# Patient Record
Sex: MALE | Race: WHITE | NOT HISPANIC OR LATINO | Employment: OTHER | URBAN - METROPOLITAN AREA
[De-identification: names, ages, dates, MRNs, and addresses within clinical notes are randomized per-mention and may not be internally consistent; named-entity substitution may affect disease eponyms.]

---

## 2022-03-16 ENCOUNTER — HISTORICAL (OUTPATIENT)
Dept: ADMINISTRATIVE | Facility: HOSPITAL | Age: 87
End: 2022-03-16

## 2022-03-16 LAB
ALBUMIN SERPL-MCNC: 3.9 G/DL (ref 3.4–4.8)
ALBUMIN/GLOB SERPL: 1.4 {RATIO} (ref 1.1–2)
ALP SERPL-CCNC: 147 U/L (ref 40–150)
ALT SERPL-CCNC: 42 U/L (ref 0–55)
AST SERPL-CCNC: 39 U/L (ref 5–34)
BILIRUB SERPL-MCNC: 0.8 MG/DL
BILIRUBIN DIRECT+TOT PNL SERPL-MCNC: 0.4 (ref 0–0.5)
BILIRUBIN DIRECT+TOT PNL SERPL-MCNC: 0.4 (ref 0–0.8)
BUN SERPL-MCNC: 20.8 MG/DL (ref 8.4–25.7)
CALCIUM SERPL-MCNC: 9.5 MG/DL (ref 8.7–10.5)
CHLORIDE SERPL-SCNC: 104 MMOL/L (ref 98–107)
CHOLEST SERPL-MCNC: 147 MG/DL
CHOLEST/HDLC SERPL: 3 {RATIO} (ref 0–5)
CO2 SERPL-SCNC: 30 MMOL/L (ref 23–31)
CREAT SERPL-MCNC: 1.3 MG/DL (ref 0.73–1.18)
GLOBULIN SER-MCNC: 2.7 G/DL (ref 2.4–3.5)
GLUCOSE SERPL-MCNC: 83 MG/DL (ref 82–115)
HDLC SERPL-MCNC: 56 MG/DL (ref 35–60)
HEMOLYSIS INTERF INDEX SERPL-ACNC: 3
ICTERIC INTERF INDEX SERPL-ACNC: 1
LDLC SERPL CALC-MCNC: 75 MG/DL (ref 50–140)
LIPEMIC INTERF INDEX SERPL-ACNC: 1
POTASSIUM SERPL-SCNC: 3.8 MMOL/L (ref 3.5–5.1)
PROT SERPL-MCNC: 6.6 G/DL (ref 5.8–7.6)
SODIUM SERPL-SCNC: 145 MMOL/L (ref 136–145)
T4 SERPL-MCNC: 11.68 UG/DL (ref 4.87–11.72)
TRIGL SERPL-MCNC: 81 MG/DL (ref 34–140)
TSH SERPL-ACNC: 2.09 M[IU]/L (ref 0.35–4.94)
VLDLC SERPL CALC-MCNC: 16 MG/DL

## 2022-05-16 ENCOUNTER — LAB REQUISITION (OUTPATIENT)
Dept: LAB | Facility: HOSPITAL | Age: 87
End: 2022-05-16
Payer: MEDICARE

## 2022-05-16 DIAGNOSIS — N18.9 CHRONIC KIDNEY DISEASE, UNSPECIFIED: ICD-10-CM

## 2022-05-16 DIAGNOSIS — E11.51 TYPE 2 DIABETES MELLITUS WITH DIABETIC PERIPHERAL ANGIOPATHY WITHOUT GANGRENE: ICD-10-CM

## 2022-05-16 DIAGNOSIS — I50.32 CHRONIC DIASTOLIC (CONGESTIVE) HEART FAILURE: ICD-10-CM

## 2022-05-16 LAB
ALBUMIN SERPL-MCNC: 3.8 GM/DL (ref 3.4–4.8)
ALP SERPL-CCNC: 144 UNIT/L (ref 40–150)
ALT SERPL-CCNC: 45 UNIT/L (ref 0–55)
AST SERPL-CCNC: 45 UNIT/L (ref 5–34)
BILIRUBIN DIRECT+TOT PNL SERPL-MCNC: 0.4 MG/DL (ref 0–0.5)
BILIRUBIN DIRECT+TOT PNL SERPL-MCNC: 0.4 MG/DL (ref 0–0.8)
BILIRUBIN DIRECT+TOT PNL SERPL-MCNC: 0.8 MG/DL
PROT SERPL-MCNC: 6.2 GM/DL (ref 5.8–7.6)

## 2022-05-16 PROCEDURE — 80076 HEPATIC FUNCTION PANEL: CPT | Performed by: INTERNAL MEDICINE

## 2022-05-17 LAB — PATH REV: NORMAL

## 2022-06-05 ENCOUNTER — HOSPITAL ENCOUNTER (EMERGENCY)
Facility: HOSPITAL | Age: 87
Discharge: HOME | End: 2022-06-06
Attending: EMERGENCY MEDICINE
Payer: MEDICARE

## 2022-06-05 DIAGNOSIS — Z79.01 ANTICOAGULATED: ICD-10-CM
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
## 2022-06-05 DIAGNOSIS — I10 UNCONTROLLED HYPERTENSION: ICD-10-CM

## 2022-06-05 DIAGNOSIS — S51.011A SKIN TEAR OF RIGHT ELBOW WITHOUT COMPLICATION, INITIAL ENCOUNTER: ICD-10-CM

## 2022-06-05 DIAGNOSIS — S00.03XA HEMATOMA OF OCCIPITAL REGION OF SCALP: ICD-10-CM

## 2022-06-05 DIAGNOSIS — S09.90XA CLOSED HEAD INJURY, INITIAL ENCOUNTER: Primary | ICD-10-CM

## 2022-06-05 LAB
ALBUMIN SERPL-MCNC: 3.6 GM/DL (ref 3.4–4.8)
ALBUMIN/GLOB SERPL: 1.3 RATIO (ref 1.1–2)
ALP SERPL-CCNC: 137 UNIT/L (ref 40–150)
ALT SERPL-CCNC: 47 UNIT/L (ref 0–55)
APTT PPP: 31.1 SECONDS (ref 23.2–33.7)
AST SERPL-CCNC: 46 UNIT/L (ref 5–34)
BASOPHILS # BLD AUTO: 0.03 X10(3)/MCL (ref 0–0.2)
BASOPHILS NFR BLD AUTO: 0.5 %
BILIRUBIN DIRECT+TOT PNL SERPL-MCNC: 0.8 MG/DL
BUN SERPL-MCNC: 17.6 MG/DL (ref 8.4–25.7)
CALCIUM SERPL-MCNC: 9 MG/DL (ref 8.8–10)
CHLORIDE SERPL-SCNC: 107 MMOL/L (ref 98–107)
CO2 SERPL-SCNC: 22 MMOL/L (ref 23–31)
CREAT SERPL-MCNC: 1.7 MG/DL (ref 0.73–1.18)
EOSINOPHIL # BLD AUTO: 0.06 X10(3)/MCL (ref 0–0.9)
EOSINOPHIL NFR BLD AUTO: 0.9 %
ERYTHROCYTE [DISTWIDTH] IN BLOOD BY AUTOMATED COUNT: 15.3 % (ref 11.5–17)
GLOBULIN SER-MCNC: 2.7 GM/DL (ref 2.4–3.5)
GLUCOSE SERPL-MCNC: 207 MG/DL (ref 82–115)
HCT VFR BLD AUTO: 41.3 % (ref 42–52)
HGB BLD-MCNC: 13.3 GM/DL (ref 14–18)
IMM GRANULOCYTES # BLD AUTO: 0.02 X10(3)/MCL (ref 0–0.02)
IMM GRANULOCYTES NFR BLD AUTO: 0.3 % (ref 0–0.43)
INR BLD: 1.44 (ref 0–1.3)
LYMPHOCYTES # BLD AUTO: 0.78 X10(3)/MCL (ref 0.6–4.6)
LYMPHOCYTES NFR BLD AUTO: 12 %
MCH RBC QN AUTO: 30.9 PG (ref 27–31)
MCHC RBC AUTO-ENTMCNC: 32.2 MG/DL (ref 33–36)
MCV RBC AUTO: 96 FL (ref 80–94)
MONOCYTES # BLD AUTO: 0.51 X10(3)/MCL (ref 0.1–1.3)
MONOCYTES NFR BLD AUTO: 7.9 %
NEUTROPHILS # BLD AUTO: 5.1 X10(3)/MCL (ref 2.1–9.2)
NEUTROPHILS NFR BLD AUTO: 78.4 %
NRBC BLD AUTO-RTO: 0 %
PLATELET # BLD AUTO: 108 X10(3)/MCL (ref 130–400)
PMV BLD AUTO: 9.9 FL (ref 9.4–12.4)
POTASSIUM SERPL-SCNC: 4.3 MMOL/L (ref 3.5–5.1)
PROT SERPL-MCNC: 6.3 GM/DL (ref 5.8–7.6)
PROTHROMBIN TIME: 17.3 SECONDS (ref 12.5–14.5)
RBC # BLD AUTO: 4.3 X10(6)/MCL (ref 4.7–6.1)
SODIUM SERPL-SCNC: 140 MMOL/L (ref 136–145)
WBC # SPEC AUTO: 6.5 X10(3)/MCL (ref 4.5–11.5)

## 2022-06-05 PROCEDURE — 90715 TDAP VACCINE 7 YRS/> IM: CPT | Performed by: EMERGENCY MEDICINE

## 2022-06-05 PROCEDURE — 99285 EMERGENCY DEPT VISIT HI MDM: CPT | Mod: 25

## 2022-06-05 PROCEDURE — 85610 PROTHROMBIN TIME: CPT | Performed by: EMERGENCY MEDICINE

## 2022-06-05 PROCEDURE — 36415 COLL VENOUS BLD VENIPUNCTURE: CPT | Performed by: EMERGENCY MEDICINE

## 2022-06-05 PROCEDURE — 63600175 PHARM REV CODE 636 W HCPCS: Performed by: EMERGENCY MEDICINE

## 2022-06-05 PROCEDURE — 85730 THROMBOPLASTIN TIME PARTIAL: CPT | Performed by: EMERGENCY MEDICINE

## 2022-06-05 PROCEDURE — 96374 THER/PROPH/DIAG INJ IV PUSH: CPT

## 2022-06-05 PROCEDURE — 90471 IMMUNIZATION ADMIN: CPT | Performed by: EMERGENCY MEDICINE

## 2022-06-05 PROCEDURE — G0390 TRAUMA RESPONS W/HOSP CRITI: HCPCS | Performed by: EMERGENCY MEDICINE

## 2022-06-05 PROCEDURE — 80053 COMPREHEN METABOLIC PANEL: CPT | Performed by: EMERGENCY MEDICINE

## 2022-06-05 PROCEDURE — 85025 COMPLETE CBC W/AUTO DIFF WBC: CPT | Performed by: EMERGENCY MEDICINE

## 2022-06-05 RX ORDER — HYDRALAZINE HYDROCHLORIDE 20 MG/ML
10 INJECTION INTRAMUSCULAR; INTRAVENOUS
Status: COMPLETED | OUTPATIENT
Start: 2022-06-05 | End: 2022-06-05

## 2022-06-05 RX ORDER — LIDOCAINE HYDROCHLORIDE AND EPINEPHRINE 20; 10 MG/ML; UG/ML
10 INJECTION, SOLUTION INFILTRATION; PERINEURAL ONCE
Status: DISCONTINUED | OUTPATIENT
Start: 2022-06-06 | End: 2022-06-06 | Stop reason: HOSPADM

## 2022-06-05 RX ORDER — LIDOCAINE HYDROCHLORIDE 20 MG/ML
INJECTION, SOLUTION INFILTRATION; PERINEURAL
Status: DISCONTINUED
Start: 2022-06-05 | End: 2022-06-06 | Stop reason: HOSPADM

## 2022-06-05 RX ADMIN — HYDRALAZINE HYDROCHLORIDE 10 MG: 20 INJECTION, SOLUTION INTRAMUSCULAR; INTRAVENOUS at 10:06

## 2022-06-05 RX ADMIN — TETANUS TOXOID, REDUCED DIPHTHERIA TOXOID AND ACELLULAR PERTUSSIS VACCINE, ADSORBED 0.5 ML: 5; 2.5; 8; 8; 2.5 SUSPENSION INTRAMUSCULAR at 10:06

## 2022-06-06 VITALS
DIASTOLIC BLOOD PRESSURE: 83 MMHG | OXYGEN SATURATION: 98 % | WEIGHT: 200 LBS | SYSTOLIC BLOOD PRESSURE: 183 MMHG | BODY MASS INDEX: 27.09 KG/M2 | RESPIRATION RATE: 19 BRPM | TEMPERATURE: 98 F | HEART RATE: 67 BPM | HEIGHT: 72 IN

## 2022-06-06 NOTE — ED PROVIDER NOTES
Encounter Date: 6/5/2022    SCRIBE #1 NOTE: I, Danae Yuan, am scribing for, and in the presence of,  Criss Garcia DO. I have scribed the following portions of the note - Other sections scribed: HPI, ROS, and PE.       History     Chief Complaint   Patient presents with    Fall      87 y.o. male with a history of DM, HTN, HLD, CVA, CKD, CAD, and CHF with a pacemaker in place who presents to the ED via EMS as a level two trauma after slipping and falling in the bathroom just PTA. Patient states he did hit the back of his head but denies any LOC. He notes he is on Eliquis. EMS states the patient has a small laceration to the posterior head and an abrasion to the R elbow. EMS reports the patient is ambulatory with a walker at baseline. Per EMS, the patient's CBG was 237 en route.       The history is provided by the patient and the EMS personnel.   Fall  The accident occurred just prior to arrival. Fall occurred: in the bathroom. Distance fallen: ground level fall. He landed on a hard floor. There was no blood loss. The point of impact was the head and right elbow. He was ambulatory at the scene. There was no entrapment after the fall. There was no drug use involved in the accident. There was no alcohol use involved in the accident. Pertinent negatives include no fever, no numbness, no nausea, no vomiting, no hematuria and no headaches. Treatment on scene includes a c-collar. He has tried nothing for the symptoms.       Review of Systems   Constitutional: Negative for chills, diaphoresis and fever.   HENT: Negative for congestion and sore throat.    Eyes: Negative for visual disturbance.   Respiratory: Negative for cough and shortness of breath.    Cardiovascular: Negative for chest pain and palpitations.   Gastrointestinal: Negative for diarrhea, nausea and vomiting.   Genitourinary: Negative for dysuria and hematuria.   Skin: Negative for rash.   Neurological: Negative for syncope, weakness, numbness and  headaches.   All other systems reviewed and are negative.      Physical Exam     Vitals:    06/05/22 2207 06/05/22 2212 06/05/22 2213 06/05/22 2220   BP: (!) 199/95  (!) 202/94 (!) 203/93   Pulse: 68  107 73   Resp: 14  18 12   Temp: 97.5 °F (36.4 °C)      SpO2: 97%  (!) 93% 97%   Weight:  90.7 kg (200 lb)     Height:  6' (1.829 m)      06/05/22 2234 06/05/22 2242 06/05/22 2327 06/05/22 2342   BP: (!) 193/94 (!) 189/87 (!) 182/90 (!) 183/83   Pulse: 67 66 68 67   Resp: (!) 95 17 15 19   Temp: 98.1 °F (36.7 °C)      SpO2: (!) 94% 97% 98% 98%   Weight:       Height:           Physical Exam    Nursing note and vitals reviewed.  Constitutional: He appears well-developed and well-nourished. He is not diaphoretic. No distress. Cervical collar in place.   HENT:   Right Ear: External ear normal. Tympanic membrane is not perforated. No hemotympanum.   Left Ear: External ear normal. Tympanic membrane is not perforated. No hemotympanum.   Nose: Nose normal. No nasal deformity, septal deviation or nasal septal hematoma. No epistaxis.   Mouth/Throat: Oropharynx is clear and moist and mucous membranes are normal.   Hematoma and abrasion to the R occipital scalp   Eyes: Conjunctivae and EOM are normal. Pupils are equal, round, and reactive to light.   Pupils 3-2 mm bilaterally   Neck: Neck supple. No tracheal deviation present.   Normal range of motion.  Cardiovascular: Normal rate, regular rhythm, normal heart sounds and intact distal pulses.   Pulses:       Radial pulses are 2+ on the right side and 2+ on the left side.        Posterior tibial pulses are 2+ on the right side and 2+ on the left side.   dopplerable L dorsalis pedis pulse and R PT pulse  +1 pitting edema   Pulmonary/Chest: Breath sounds normal. No respiratory distress. He exhibits no tenderness.   Abdominal: Abdomen is soft. Bowel sounds are normal. He exhibits no distension. There is no abdominal tenderness. There is no rebound.   Musculoskeletal:         General:  No tenderness. Normal range of motion.      Cervical back: Normal range of motion and neck supple. No deformity or tenderness. No spinous process tenderness or muscular tenderness.      Thoracic back: No deformity or tenderness.      Lumbar back: No deformity or tenderness.      Comments: No step-offs  Skin tear to right elbow      Neurological: He is alert and oriented to person, place, and time. He has normal strength. No cranial nerve deficit or sensory deficit. GCS eye subscore is 4. GCS verbal subscore is 5. GCS motor subscore is 6.   Skin: Skin is warm and dry. Capillary refill takes less than 2 seconds. No ecchymosis noted. No pallor.   Chronic skin changes to the bilateral LE       Psychiatric: He has a normal mood and affect.         ED Course   Procedures  Labs Reviewed   COMPREHENSIVE METABOLIC PANEL - Abnormal; Notable for the following components:       Result Value    Carbon Dioxide 22 (*)     Glucose Level 207 (*)     Creatinine 1.70 (*)     Aspartate Aminotransferase 46 (*)     All other components within normal limits   PROTIME-INR - Abnormal; Notable for the following components:    PT 17.3 (*)     INR 1.44 (*)     All other components within normal limits   CBC WITH DIFFERENTIAL - Abnormal; Notable for the following components:    RBC 4.30 (*)     Hgb 13.3 (*)     Hct 41.3 (*)     MCV 96.0 (*)     MCHC 32.2 (*)     Platelet 108 (*)     IG# 0.02 (*)     All other components within normal limits   APTT - Normal   CBC W/ AUTO DIFFERENTIAL    Narrative:     The following orders were created for panel order CBC auto differential.  Procedure                               Abnormality         Status                     ---------                               -----------         ------                     CBC with Differential[[id number]]        Abnormal            Final result                 Please view results for these tests on the individual orders.          Imaging Results          CT Head Without  Contrast (Final result)  Result time 06/06/22 06:03:59    Final result by Yang Bland MD (06/06/22 06:03:59)                 Impression:    Impression:    1. There is a large posterior right parietal scalp contusion with likely hematoma component. No underlying bony injury is identified.    2. No acute intracranial process identified. Details as above.      Electronically signed by: Yang Bland  Date:    06/06/2022  Time:    06:03             Narrative:    EXAMINATION:  CT HEAD WITHOUT CONTRAST    CLINICAL HISTORY:  fall on blood thinners;    TECHNIQUE:  Low dose axial images were obtained through the head.  Coronal and sagittal reformations were also performed. Contrast was not administered.    Automatic exposure control was utilized to reduce the patient's radiation dose.    DLP= 956    COMPARISON:  None.    FINDINGS:  Hemorrhage: No acute intracranial hemorrhage is seen.    CSF spaces: The ventricles, sulci and basal cisterns all appear moderately prominent consistent with global cerebral atrophy.    Brain parenchyma: There is preservation of the grey white junction throughout. Mild microvascular change is seen in portions of the periventricular and deep white matter tracts. An old infarct is seen in the right basal ganglia with ex vacuo dilatation of the frontal horn of the right lateral ventricle (series 2, image 17). An old lacunar infarct is present in the left basal ganglia (series 2, image 20).    Cerebellum: Unremarkable.    Sella and skull base: The sella appears to be within normal limits for age.    Herniation: None.    Intracranial calcifications: Incidental note is made of bilateral choroid plexus calcification. Incidental note is made of some pineal region calcification. Incidental note is made of some calcification of the falx. There is focal calcification in the right perisylvian temporal sulcus (series 2, image 18) which may be vascular in origin.    Calvarium: No acute linear or  depressed skull fracture is seen.    Scalp: There is a large posterior right parietal scalp contusion with likely hematoma component. There is a 4 mm calcific density in the left paramedian frontal scalp (series 4, image 17). Note is made of a bony exostosis near this hematoma.    Maxillofacial Structures:    Paranasal sinuses: A small fluid level is seen in the left maxillary sinus.    Orbits: The orbits appear unremarkable.    Zygomatic arches: The zygomatic arches are intact and unremarkable.    Temporal bones and mastoids: The temporal bones and mastoids appear unremarkable.    TMJ: The mandibular condyles appear normally placed with respect to the mandibular fossa.                    Preliminary result by Yang Bland MD (06/05/22 22:29:57)                 Narrative:    START OF REPORT:  Technique: CT of the head was performed without intravenous contrast with axial as well as coronal and sagittal images.    Comparison: None.    Dosage Information: Automated exposure control was utilized.    Clinical history: Fell, hit posterior head.    Findings:  Hemorrhage: No acute intracranial hemorrhage is seen.  CSF spaces: The ventricles, sulci and basal cisterns all appear moderately prominent consistent with global cerebral atrophy.  Brain parenchyma: There is preservation of the grey white junction throughout. Mild microvascular change is seen in portions of the periventricular and deep white matter tracts. An old infarct is seen in the right basal ganglia with ex vacuo dilatation of the frontal horn of the right lateral ventricle (series 2, image 17). An old lacunar infarct is present in the left basal ganglia (series 2, image 20).  Cerebellum: Unremarkable.  Sella and skull base: The sella appears to be within normal limits for age.  Herniation: None.  Intracranial calcifications: Incidental note is made of bilateral choroid plexus calcification. Incidental note is made of some pineal region calcification.  Incidental note is made of some calcification of the falx. There is focal calcification in the right perisylvian temporal sulcus (series 2, image 18) which may be vascular in origin.  Calvarium: No acute linear or depressed skull fracture is seen.  Scalp: There is a large posterior right parietal scalp contusion with likely hematoma component. There is a 4 mm calcific density in the left paramedian frontal scalp (series 4, image 17). Note is made of a bony exostosis near this hematoma.    Maxillofacial Structures:  Paranasal sinuses: A small fluid level is seen in the left maxillary sinus.  Orbits: The orbits appear unremarkable.  Zygomatic arches: The zygomatic arches are intact and unremarkable.  Temporal bones and mastoids: The temporal bones and mastoids appear unremarkable.  TMJ: The mandibular condyles appear normally placed with respect to the mandibular fossa.      Impression:  1. There is a large posterior right parietal scalp contusion with likely hematoma component. No underlying bony injury is identified.  2. No acute intracranial process identified. Details as above.                                 CT Cervical Spine Without Contrast (Final result)  Result time 06/06/22 06:05:55    Final result by Yang Bland MD (06/06/22 06:05:55)                 Impression:    Impression:    1. No acute fracture dislocation or subluxation is seen.    2. Degenerative changes and other details as above.      Electronically signed by: Yang Bland  Date:    06/06/2022  Time:    06:05             Narrative:    EXAMINATION:  CT CERVICAL SPINE WITHOUT CONTRAST    CLINICAL HISTORY:  fall, hit head on thinners;    TECHNIQUE:  CT of the cervical spine Without contrast. Sagittal and coronal reconstructions were performed on the source images.    Automatic exposure control was utilized to reduce the patient's radiation dose.    DLP = 295    COMPARISON:  No prior imaging available for comparison.    FINDINGS:  Lung apices:  The visualized lung apices appear unremarkable.    Spine:    Spinal canal: Mild canal stenosis is seen at C5-C6 and C6-C7 secondary to disc herniation.    Spinal cord: The spinal cord appears unremarkable.    Mineralization: Mild osteopenia is seen in the visualized bony structures.    Scoliosis: No significant scoliosis is seen.    Vertebral Fusion: No vertebral fusion is identified.    Listhesis: No significant listhesis is identified.    Lordosis: The cervical lordosis is maintained.    Intervertebral disc spaces: Moderately decreased disc height is seen at C5-C6 and C6-C7.    Osteophytes: Pronounced multilevel endplate osteophytes are seen.    Endplate Sclerosis: Multilevel endplate sclerosis is seen.    Uncovertebral degenerative changes: Pronounced multilevel uncovertebral joint arthrosis is seen.    Facet degenerative changes: Pronounced multilevel facet degenerative changes are seen.    Fractures: No acute fracture dislocation or subluxation is seen.                    Preliminary result by Yang Bland MD (06/05/22 22:28:22)                 Narrative:    START OF REPORT:  Technique: CT of the cervical spine was performed without intravenous contrast with axial as well as sagittal and coronal images.    Comparison: None.    Dosage Information: Automated exposure control was utilized.    Clinical history: Fell, hit posterior head.    Findings:  Lung apices: The visualized lung apices appear unremarkable.  Spine:  Spinal canal: Mild canal stenosis is seen at C5-C6 and C6-C7 secondary to disc herniation.  Spinal cord: The spinal cord appears unremarkable.  Mineralization: Mild osteopenia is seen in the visualized bony structures.  Scoliosis: No significant scoliosis is seen.  Vertebral Fusion: No vertebral fusion is identified.  Listhesis: No significant listhesis is identified.  Lordosis: The cervical lordosis is maintained.  Intervertebral disc spaces: Moderately decreased disc height is seen at C5-C6  and C6-C7.  Osteophytes: Pronounced multilevel endplate osteophytes are seen.  Endplate Sclerosis: Multilevel endplate sclerosis is seen.  Uncovertebral degenerative changes: Pronounced multilevel uncovertebral joint arthrosis is seen.  Facet degenerative changes: Pronounced multilevel facet degenerative changes are seen.  Fractures: No acute fracture dislocation or subluxation is seen.      Impression:  1. No acute fracture dislocation or subluxation is seen.  2. Degenerative changes and other details as above.                                 X-Ray Chest AP Portable (Final result)  Result time 06/06/22 06:40:41    Final result by Yang Bland MD (06/06/22 06:40:41)                 Impression:      No acute cardiopulmonary process.  Patient's hand obscures the right upper lobe.      Electronically signed by: Yang Bland  Date:    06/06/2022  Time:    06:40             Narrative:    EXAMINATION:  XR CHEST AP PORTABLE    CLINICAL HISTORY:  Unspecified fall, initial encounter    TECHNIQUE:  Single view of the chest    COMPARISON:  No prior imaging available for comparison.    FINDINGS:  No focal opacification, pleural effusion, or pneumothorax.    Cardiomediastinal silhouette is prominent.    No acute osseous abnormality.                              X-Rays:   Independently Interpreted Readings:   Other Readings:  CXR: no acute cardiopulmonary process        Medications   Tdap (BOOSTRIX) vaccine injection 0.5 mL (0.5 mLs Intramuscular Given 6/5/22 2230)   hydrALAZINE injection 10 mg (10 mg Intravenous Given 6/5/22 2241)     Medical Decision Making:   Initial Assessment:   87-year-old male presenting as a fall, hit his head and is on blood thinners.  Level 2 trauma.  ABC's intact.  Vital stable.  Small hematoma/abrasion to back of head. GCS 15   Independently Interpreted Test(s):   I have ordered and independently interpreted X-rays - see prior notes.  Clinical Tests:   Lab Tests: Ordered and Reviewed       <>  Summary of Lab: Mild hyperglycemia and anemia   Radiological Study: Ordered and Reviewed  ED Management:  Tdap given   CXR clear  CT head and c-spine normal            Scribe Attestation:   Scribe #1: I performed the above scribed service and the documentation accurately describes the services I performed. I attest to the accuracy of the note.    Attending Attestation:           Physician Attestation for Scribe:  Physician Attestation Statement for Scribe #1: I, Danae Yuan, reviewed documentation, as scribed by Criss Garcia, DO in my presence, and it is both accurate and complete.                      Clinical Impression:   Final diagnoses:  [W19.XXXA] Fall  [S09.90XA] Closed head injury, initial encounter (Primary)  [S00.03XA] Hematoma of occipital region of scalp  [S51.011A] Skin tear of right elbow without complication, initial encounter  [Z79.01] Anticoagulated  [I10] Uncontrolled hypertension          ED Disposition Condition    Discharge Stable        ED Prescriptions     None        Follow-up Information     Follow up With Specialties Details Why Contact Info    [facility] - Emergency Dept Emergency Medicine  As needed, If symptoms worsen [address] [facility] [id number]  [phone]           Criss Garcia MD  06/11/22 0778

## 2022-09-28 ENCOUNTER — HOSPITAL ENCOUNTER (EMERGENCY)
Facility: HOSPITAL | Age: 87
Discharge: HOME | End: 2022-09-28
Attending: STUDENT IN AN ORGANIZED HEALTH CARE EDUCATION/TRAINING PROGRAM
Payer: MEDICARE

## 2022-09-28 VITALS
SYSTOLIC BLOOD PRESSURE: 172 MMHG | HEIGHT: 72 IN | HEART RATE: 65 BPM | DIASTOLIC BLOOD PRESSURE: 80 MMHG | WEIGHT: 170 LBS | OXYGEN SATURATION: 96 % | BODY MASS INDEX: 23.03 KG/M2 | TEMPERATURE: 98 F | RESPIRATION RATE: 20 BRPM

## 2022-09-28 DIAGNOSIS — S80.01XA CONTUSION OF RIGHT KNEE, INITIAL ENCOUNTER: Primary | ICD-10-CM

## 2022-09-28 DIAGNOSIS — M25.561 RIGHT KNEE PAIN: ICD-10-CM

## 2022-09-28 DIAGNOSIS — S09.90XA CLOSED HEAD INJURY, INITIAL ENCOUNTER: ICD-10-CM

## 2022-09-28 PROCEDURE — 99284 EMERGENCY DEPT VISIT MOD MDM: CPT | Mod: 25

## 2022-09-28 NOTE — FIRST PROVIDER EVALUATION
Medical screening examination initiated.  I have conducted a focused provider triage encounter, findings are as follows:    Brief history of present illness:  89 yo male presents to ED for evaluation of right knee pain after fall 3 days ago.     Vitals:    09/28/22 1754   BP: (!) 174/56   Pulse: 68   Resp: 18   Temp: 97.7 °F (36.5 °C)   TempSrc: Oral   SpO2: 98%   Weight: 77.1 kg (170 lb)   Height: 6' (1.829 m)       Pertinent physical exam:  Awake and sitting in chair    Brief workup plan:  XR knee    Preliminary workup initiated; this workup will be continued and followed by the physician or advanced practice provider that is assigned to the patient when roomed.

## 2022-09-29 NOTE — ED PROVIDER NOTES
Encounter Date: 9/28/2022       History     Chief Complaint   Patient presents with    Knee Pain     Pt to ER via [facility] for right knee pain.  Fall 3 days ago.  Increased pain and difficulty walking.     Patient is a 88-year-old male  that presents with fall that has been present 3 days ago. Associated symptoms hit head and right knee. Surrounding information is lost balance. Exacerbated by nothing. Relieved by nothing. Patient treatment prior to arrival none. Risk factors include none. Other history pertaining to this complaint nothing.       The history is provided by the patient. No  was used.   Review of patient's allergies indicates:  No Known Allergies  Past Medical History:   Diagnosis Date    Diabetes mellitus     Hypercholesterolemia     Hypertension     Stroke      Past Surgical History:   Procedure Laterality Date    INSERTION OF PACEMAKER      KNEE SURGERY Right      History reviewed. No pertinent family history.  Social History     Tobacco Use    Smoking status: Never    Smokeless tobacco: Never   Substance Use Topics    Alcohol use: Not Currently    Drug use: Not Currently     Review of Systems   Constitutional:  Negative for fever.   Respiratory:  Negative for cough and shortness of breath.    Cardiovascular:  Negative for chest pain.   Gastrointestinal:  Negative for abdominal pain.   Genitourinary:  Negative for difficulty urinating and dysuria.   Musculoskeletal:  Negative for gait problem.   Skin:  Negative for color change.   Neurological:  Negative for dizziness, speech difficulty and headaches.   Psychiatric/Behavioral:  Negative for hallucinations and suicidal ideas.    All other systems reviewed and are negative.    Physical Exam     Initial Vitals [09/28/22 1754]   BP Pulse Resp Temp SpO2   (!) 174/56 68 18 97.7 °F (36.5 °C) 98 %      MAP       --         Physical Exam    Nursing note and vitals reviewed.  Constitutional: He appears well-developed and well-nourished.    HENT:   Head: Normocephalic.   Eyes: EOM are normal.   Neck: Neck supple.   Normal range of motion.  Cardiovascular:  Normal rate, regular rhythm, normal heart sounds and intact distal pulses.           Pulmonary/Chest: Breath sounds normal.   Abdominal: Abdomen is soft. Bowel sounds are normal.   Musculoskeletal:         General: Normal range of motion.      Cervical back: Normal range of motion and neck supple.      Comments: Swelling right knee     Neurological: He is alert and oriented to person, place, and time. He has normal strength.   Skin: Skin is warm and dry. Capillary refill takes less than 2 seconds.   Abrasion to forehead   Psychiatric: He has a normal mood and affect. His behavior is normal. Judgment and thought content normal.       ED Course   Procedures  Labs Reviewed - No data to display       Imaging Results              CT Cervical Spine Without Contrast (Final result)  Result time 09/28/22 21:10:28      Final result by Bird Cheng MD (09/28/22 21:10:28)                   Impression:      No fracture of the cervical spine.  Multilevel spondylotic changes are noted with multilevel neural foraminal narrowing.      Electronically signed by: Bird Cheng MD  Date:    09/28/2022  Time:    21:10               Narrative:    EXAMINATION:  CT CERVICAL SPINE WITHOUT CONTRAST    CLINICAL HISTORY:  fall;    TECHNIQUE:  Low dose axial images, sagittal and coronal reformations were performed though the cervical spine.  Contrast was not administered.    COMPARISON:  06/05/2022    FINDINGS:  Straightening the normal lordotic curvature.  The alignment is normal.  The vertebral heights are maintained intervertebral disc space narrowing is identified.  No evidence for compression deformity, fracture, or subluxation.  Ankylosed anterior osteophytes are identified.  No evidence for central canal stenosis.  Multilevel uncovertebral facet arthropathy identified with multilevel neural foraminal narrowing.    The  soft tissues of the neck are grossly normal.  Moderate to large effusion is identified on the right with atelectatic changes.                                       CT Head Without Contrast (Final result)  Result time 09/28/22 21:06:36      Final result by Bird Cheng MD (09/28/22 21:06:36)                   Impression:      No acute intracranial abnormality.  Findings consistent with microangiopathy.      Electronically signed by: Bird Cheng MD  Date:    09/28/2022  Time:    21:06               Narrative:    EXAMINATION:  CT HEAD WITHOUT CONTRAST    CLINICAL HISTORY:  fall;    TECHNIQUE:  Low dose axial CT images obtained throughout the head without intravenous contrast. Sagittal and coronal reconstructions were performed.    COMPARISON:  06/05/2022    FINDINGS:  Intracranial compartment:    Ventricles and sulci are normal in size for age with ex vacuo dilatation..  No extra-axial blood or fluid collections.    The brain parenchyma appears normal. No parenchymal mass, hemorrhage, edema or major vascular distribution infarct.    Skull/extracranial contents (limited evaluation): No fracture. Mastoid air cells and paranasal sinuses are essentially clear.                                       X-Ray Knee Complete 4 Or More Views Right (In process)                      Medications - No data to display                ED Course as of 09/28/22 2203   Wed Sep 28, 2022   2037 CT head indicated. Fall hit head on blood thinners [CL]   2202 Reviewed knee xray with Dr Garcia [CL]      ED Course User Index  [CL] GOLDY Dixon                 Clinical Impression:   Final diagnoses:  [M25.561] Right knee pain  [S80.01XA] Contusion of right knee, initial encounter (Primary)  [S09.90XA] Closed head injury, initial encounter        ED Disposition Condition    Discharge Stable          ED Prescriptions    None       Follow-up Information       Follow up With Specialties Details Why Contact Info    Your Primary Care Provider   Call in 3 days ed follow up              Jay Rivera, GOLDY  09/28/22 0405

## 2023-01-19 ENCOUNTER — LAB REQUISITION (OUTPATIENT)
Dept: LAB | Facility: HOSPITAL | Age: 88
End: 2023-01-19
Payer: MEDICARE

## 2023-01-19 DIAGNOSIS — N18.9 CHRONIC KIDNEY DISEASE, UNSPECIFIED: ICD-10-CM

## 2023-01-19 DIAGNOSIS — N39.0 URINARY TRACT INFECTION, SITE NOT SPECIFIED: ICD-10-CM

## 2023-01-19 LAB
APPEARANCE UR: CLEAR
BACTERIA #/AREA URNS AUTO: NORMAL /HPF
BILIRUB UR QL STRIP.AUTO: NEGATIVE MG/DL
COLOR UR AUTO: YELLOW
GLUCOSE UR QL STRIP.AUTO: NEGATIVE MG/DL
KETONES UR QL STRIP.AUTO: NEGATIVE MG/DL
LEUKOCYTE ESTERASE UR QL STRIP.AUTO: NEGATIVE UNIT/L
NITRITE UR QL STRIP.AUTO: NEGATIVE
PH UR STRIP.AUTO: 7 [PH]
PROT UR QL STRIP.AUTO: NEGATIVE MG/DL
RBC #/AREA URNS AUTO: <5 /HPF
RBC UR QL AUTO: NEGATIVE UNIT/L
SP GR UR STRIP.AUTO: 1.01 (ref 1–1.03)
SQUAMOUS #/AREA URNS AUTO: <5 /HPF
UROBILINOGEN UR STRIP-ACNC: 0.2 MG/DL
WBC #/AREA URNS AUTO: <5 /HPF

## 2023-01-19 PROCEDURE — 81001 URINALYSIS AUTO W/SCOPE: CPT

## 2023-02-06 ENCOUNTER — LAB REQUISITION (OUTPATIENT)
Dept: LAB | Facility: HOSPITAL | Age: 88
End: 2023-02-06
Payer: MEDICARE

## 2023-02-06 DIAGNOSIS — E11.22 TYPE 2 DIABETES MELLITUS WITH DIABETIC CHRONIC KIDNEY DISEASE: ICD-10-CM

## 2023-02-06 DIAGNOSIS — I13.0 HYPERTENSIVE HEART AND CHRONIC KIDNEY DISEASE WITH HEART FAILURE AND STAGE 1 THROUGH STAGE 4 CHRONIC KIDNEY DISEASE, OR UNSPECIFIED CHRONIC KIDNEY DISEASE: ICD-10-CM

## 2023-02-06 DIAGNOSIS — I25.10 ATHEROSCLEROTIC HEART DISEASE OF NATIVE CORONARY ARTERY WITHOUT ANGINA PECTORIS: ICD-10-CM

## 2023-02-06 DIAGNOSIS — I50.42 CHRONIC COMBINED SYSTOLIC (CONGESTIVE) AND DIASTOLIC (CONGESTIVE) HEART FAILURE: ICD-10-CM

## 2023-02-06 DIAGNOSIS — N18.9 CHRONIC KIDNEY DISEASE, UNSPECIFIED: ICD-10-CM

## 2023-02-06 LAB
ALBUMIN SERPL-MCNC: 3.4 G/DL (ref 3.4–4.8)
ALBUMIN/GLOB SERPL: 1.5 RATIO (ref 1.1–2)
ALP SERPL-CCNC: 167 UNIT/L (ref 40–150)
ALT SERPL-CCNC: 87 UNIT/L (ref 0–55)
AST SERPL-CCNC: 82 UNIT/L (ref 5–34)
BASOPHILS # BLD AUTO: 0.03 X10(3)/MCL (ref 0–0.2)
BASOPHILS NFR BLD AUTO: 0.6 %
BILIRUBIN DIRECT+TOT PNL SERPL-MCNC: 0.9 MG/DL
BUN SERPL-MCNC: 23.8 MG/DL (ref 8.4–25.7)
CALCIUM SERPL-MCNC: 9.4 MG/DL (ref 8.8–10)
CHLORIDE SERPL-SCNC: 102 MMOL/L (ref 98–107)
CO2 SERPL-SCNC: 31 MMOL/L (ref 23–31)
CREAT SERPL-MCNC: 1.61 MG/DL (ref 0.73–1.18)
EOSINOPHIL # BLD AUTO: 0.03 X10(3)/MCL (ref 0–0.9)
EOSINOPHIL NFR BLD AUTO: 0.6 %
ERYTHROCYTE [DISTWIDTH] IN BLOOD BY AUTOMATED COUNT: 21 % (ref 11.5–17)
GFR SERPLBLD CREATININE-BSD FMLA CKD-EPI: 41 MLS/MIN/1.73/M2
GLOBULIN SER-MCNC: 2.3 GM/DL (ref 2.4–3.5)
GLUCOSE SERPL-MCNC: 139 MG/DL (ref 82–115)
HCT VFR BLD AUTO: 38.5 % (ref 42–52)
HGB BLD-MCNC: 12.1 GM/DL (ref 14–18)
IMM GRANULOCYTES # BLD AUTO: 0.02 X10(3)/MCL (ref 0–0.04)
IMM GRANULOCYTES NFR BLD AUTO: 0.4 %
LYMPHOCYTES # BLD AUTO: 0.84 X10(3)/MCL (ref 0.6–4.6)
LYMPHOCYTES NFR BLD AUTO: 16 %
MCH RBC QN AUTO: 28.6 PG
MCHC RBC AUTO-ENTMCNC: 31.4 MG/DL (ref 33–36)
MCV RBC AUTO: 91 FL (ref 80–94)
MONOCYTES # BLD AUTO: 0.41 X10(3)/MCL (ref 0.1–1.3)
MONOCYTES NFR BLD AUTO: 7.8 %
NEUTROPHILS # BLD AUTO: 3.92 X10(3)/MCL (ref 2.1–9.2)
NEUTROPHILS NFR BLD AUTO: 74.6 %
NRBC BLD AUTO-RTO: 0 %
PLATELET # BLD AUTO: 134 X10(3)/MCL (ref 130–400)
PMV BLD AUTO: 11.1 FL (ref 7.4–10.4)
POTASSIUM SERPL-SCNC: 4.1 MMOL/L (ref 3.5–5.1)
PROT SERPL-MCNC: 5.7 GM/DL (ref 5.8–7.6)
RBC # BLD AUTO: 4.23 X10(6)/MCL (ref 4.7–6.1)
SODIUM SERPL-SCNC: 142 MMOL/L (ref 136–145)
WBC # SPEC AUTO: 5.3 X10(3)/MCL (ref 4.5–11.5)

## 2023-02-06 PROCEDURE — 85025 COMPLETE CBC W/AUTO DIFF WBC: CPT

## 2023-02-06 PROCEDURE — 80053 COMPREHEN METABOLIC PANEL: CPT

## 2023-04-10 ENCOUNTER — HOSPITAL ENCOUNTER (OUTPATIENT)
Facility: HOSPITAL | Age: 88
LOS: 2 days | Discharge: NURSING, REHAB OR LONG-TERM CARE | End: 2023-04-17
Attending: STUDENT IN AN ORGANIZED HEALTH CARE EDUCATION/TRAINING PROGRAM | Admitting: INTERNAL MEDICINE
Payer: MEDICARE

## 2023-04-10 DIAGNOSIS — R33.9 URINARY RETENTION: ICD-10-CM

## 2023-04-10 DIAGNOSIS — S32.030A CLOSED COMPRESSION FRACTURE OF L3 LUMBAR VERTEBRA, INITIAL ENCOUNTER: Primary | ICD-10-CM

## 2023-04-10 DIAGNOSIS — R07.9 CHEST PAIN: ICD-10-CM
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
## 2023-04-10 LAB
ALBUMIN SERPL-MCNC: 3.4 G/DL (ref 3.4–4.8)
ALBUMIN/GLOB SERPL: 1.1 RATIO (ref 1.1–2)
ALP SERPL-CCNC: 159 UNIT/L (ref 40–150)
ALT SERPL-CCNC: 53 UNIT/L (ref 0–55)
APPEARANCE UR: CLEAR
AST SERPL-CCNC: 54 UNIT/L (ref 5–34)
BACTERIA #/AREA URNS AUTO: NORMAL /HPF
BASOPHILS # BLD AUTO: 0.04 X10(3)/MCL (ref 0–0.2)
BASOPHILS NFR BLD AUTO: 0.6 %
BILIRUB UR QL STRIP.AUTO: NEGATIVE MG/DL
BILIRUBIN DIRECT+TOT PNL SERPL-MCNC: 0.9 MG/DL
BUN SERPL-MCNC: 20.7 MG/DL (ref 8.4–25.7)
CALCIUM SERPL-MCNC: 9.2 MG/DL (ref 8.8–10)
CHLORIDE SERPL-SCNC: 105 MMOL/L (ref 98–107)
CO2 SERPL-SCNC: 30 MMOL/L (ref 23–31)
COLOR UR AUTO: YELLOW
CREAT SERPL-MCNC: 1.48 MG/DL (ref 0.73–1.18)
EOSINOPHIL # BLD AUTO: 0.02 X10(3)/MCL (ref 0–0.9)
EOSINOPHIL NFR BLD AUTO: 0.3 %
ERYTHROCYTE [DISTWIDTH] IN BLOOD BY AUTOMATED COUNT: 16.3 % (ref 11.5–17)
GFR SERPLBLD CREATININE-BSD FMLA CKD-EPI: 45 MLS/MIN/1.73/M2
GLOBULIN SER-MCNC: 3.1 GM/DL (ref 2.4–3.5)
GLUCOSE SERPL-MCNC: 73 MG/DL (ref 82–115)
GLUCOSE UR QL STRIP.AUTO: NEGATIVE MG/DL
HCT VFR BLD AUTO: 41.6 % (ref 42–52)
HGB BLD-MCNC: 13.7 G/DL (ref 14–18)
IMM GRANULOCYTES # BLD AUTO: 0.02 X10(3)/MCL (ref 0–0.04)
IMM GRANULOCYTES NFR BLD AUTO: 0.3 %
KETONES UR QL STRIP.AUTO: NEGATIVE MG/DL
LEUKOCYTE ESTERASE UR QL STRIP.AUTO: NEGATIVE UNIT/L
LYMPHOCYTES # BLD AUTO: 0.73 X10(3)/MCL (ref 0.6–4.6)
LYMPHOCYTES NFR BLD AUTO: 10.2 %
MCH RBC QN AUTO: 29.1 PG (ref 27–31)
MCHC RBC AUTO-ENTMCNC: 32.9 G/DL (ref 33–36)
MCV RBC AUTO: 88.5 FL (ref 80–94)
MONOCYTES # BLD AUTO: 0.6 X10(3)/MCL (ref 0.1–1.3)
MONOCYTES NFR BLD AUTO: 8.4 %
NEUTROPHILS # BLD AUTO: 5.73 X10(3)/MCL (ref 2.1–9.2)
NEUTROPHILS NFR BLD AUTO: 80.2 %
NITRITE UR QL STRIP.AUTO: NEGATIVE
NRBC BLD AUTO-RTO: 0 %
PH UR STRIP.AUTO: 6.5 [PH]
PLATELET # BLD AUTO: 146 X10(3)/MCL (ref 130–400)
PMV BLD AUTO: 10.5 FL (ref 7.4–10.4)
POCT GLUCOSE: 73 MG/DL (ref 70–110)
POTASSIUM SERPL-SCNC: 4.1 MMOL/L (ref 3.5–5.1)
PROT SERPL-MCNC: 6.5 GM/DL (ref 5.8–7.6)
PROT UR QL STRIP.AUTO: NEGATIVE MG/DL
RBC # BLD AUTO: 4.7 X10(6)/MCL (ref 4.7–6.1)
RBC #/AREA URNS AUTO: <5 /HPF
RBC UR QL AUTO: NEGATIVE UNIT/L
SODIUM SERPL-SCNC: 142 MMOL/L (ref 136–145)
SP GR UR STRIP.AUTO: 1.01 (ref 1–1.03)
SQUAMOUS #/AREA URNS AUTO: <5 /HPF
UROBILINOGEN UR STRIP-ACNC: 0.2 MG/DL
WBC # SPEC AUTO: 7.1 X10(3)/MCL (ref 4.5–11.5)
WBC #/AREA URNS AUTO: <5 /HPF

## 2023-04-10 PROCEDURE — 85025 COMPLETE CBC W/AUTO DIFF WBC: CPT | Performed by: PHYSICIAN ASSISTANT

## 2023-04-10 PROCEDURE — 25500020 PHARM REV CODE 255: Performed by: STUDENT IN AN ORGANIZED HEALTH CARE EDUCATION/TRAINING PROGRAM

## 2023-04-10 PROCEDURE — 51702 INSERT TEMP BLADDER CATH: CPT | Mod: 59

## 2023-04-10 PROCEDURE — 81001 URINALYSIS AUTO W/SCOPE: CPT | Performed by: PHYSICIAN ASSISTANT

## 2023-04-10 PROCEDURE — 82962 GLUCOSE BLOOD TEST: CPT

## 2023-04-10 PROCEDURE — 11000001 HC ACUTE MED/SURG PRIVATE ROOM

## 2023-04-10 PROCEDURE — 99285 EMERGENCY DEPT VISIT HI MDM: CPT | Mod: 25

## 2023-04-10 PROCEDURE — 80053 COMPREHEN METABOLIC PANEL: CPT | Performed by: PHYSICIAN ASSISTANT

## 2023-04-10 RX ORDER — INSULIN ASPART 100 [IU]/ML
1-10 INJECTION, SOLUTION INTRAVENOUS; SUBCUTANEOUS
Status: DISCONTINUED | OUTPATIENT
Start: 2023-04-10 | End: 2023-04-17 | Stop reason: HOSPADM

## 2023-04-10 RX ORDER — MUPIROCIN 20 MG/G
OINTMENT TOPICAL 2 TIMES DAILY
Status: DISPENSED | OUTPATIENT
Start: 2023-04-10 | End: 2023-04-15

## 2023-04-10 RX ORDER — IBUPROFEN 200 MG
16 TABLET ORAL
Status: DISCONTINUED | OUTPATIENT
Start: 2023-04-10 | End: 2023-04-17 | Stop reason: HOSPADM

## 2023-04-10 RX ORDER — FUROSEMIDE 20 MG/1
1 TABLET ORAL EVERY MORNING
Status: ON HOLD | COMMUNITY
Start: 2023-03-23 | End: 2023-04-17 | Stop reason: HOSPADM

## 2023-04-10 RX ORDER — TALC
6 POWDER (GRAM) TOPICAL NIGHTLY PRN
Status: DISCONTINUED | OUTPATIENT
Start: 2023-04-10 | End: 2023-04-17 | Stop reason: HOSPADM

## 2023-04-10 RX ORDER — ACETAMINOPHEN 325 MG/1
650 TABLET ORAL EVERY 6 HOURS PRN
Status: DISCONTINUED | OUTPATIENT
Start: 2023-04-10 | End: 2023-04-16

## 2023-04-10 RX ORDER — HYDROCODONE BITARTRATE AND ACETAMINOPHEN 5; 325 MG/1; MG/1
1 TABLET ORAL EVERY 4 HOURS PRN
Status: DISCONTINUED | OUTPATIENT
Start: 2023-04-10 | End: 2023-04-11

## 2023-04-10 RX ORDER — IBUPROFEN 200 MG
32 TABLET ORAL
Status: DISCONTINUED | OUTPATIENT
Start: 2023-04-10 | End: 2023-04-17 | Stop reason: HOSPADM

## 2023-04-10 RX ORDER — POLYETHYLENE GLYCOL 3350 17 G/17G
17 POWDER, FOR SOLUTION ORAL 2 TIMES DAILY PRN
Status: DISCONTINUED | OUTPATIENT
Start: 2023-04-10 | End: 2023-04-17 | Stop reason: HOSPADM

## 2023-04-10 RX ORDER — GLUCAGON 1 MG
1 KIT INJECTION
Status: DISCONTINUED | OUTPATIENT
Start: 2023-04-10 | End: 2023-04-17 | Stop reason: HOSPADM

## 2023-04-10 RX ORDER — NALOXONE HCL 0.4 MG/ML
0.02 VIAL (ML) INJECTION
Status: DISCONTINUED | OUTPATIENT
Start: 2023-04-10 | End: 2023-04-17 | Stop reason: HOSPADM

## 2023-04-10 RX ORDER — PROCHLORPERAZINE EDISYLATE 5 MG/ML
5 INJECTION INTRAMUSCULAR; INTRAVENOUS EVERY 6 HOURS PRN
Status: DISCONTINUED | OUTPATIENT
Start: 2023-04-10 | End: 2023-04-14

## 2023-04-10 RX ORDER — LOSARTAN POTASSIUM 25 MG/1
25 TABLET ORAL 2 TIMES DAILY
Status: ON HOLD | COMMUNITY
Start: 2023-04-07 | End: 2023-04-17 | Stop reason: HOSPADM

## 2023-04-10 RX ORDER — POTASSIUM CHLORIDE 1500 MG/1
20 TABLET, EXTENDED RELEASE ORAL DAILY
COMMUNITY
Start: 2023-04-06

## 2023-04-10 RX ORDER — ONDANSETRON 2 MG/ML
4 INJECTION INTRAMUSCULAR; INTRAVENOUS EVERY 4 HOURS PRN
Status: DISCONTINUED | OUTPATIENT
Start: 2023-04-10 | End: 2023-04-14

## 2023-04-10 RX ORDER — APIXABAN 5 MG/1
5 TABLET, FILM COATED ORAL 2 TIMES DAILY
COMMUNITY
Start: 2023-04-06

## 2023-04-10 RX ORDER — AMIODARONE HYDROCHLORIDE 200 MG/1
200 TABLET ORAL 2 TIMES DAILY
COMMUNITY
Start: 2022-01-12

## 2023-04-10 RX ORDER — SIMETHICONE 80 MG
1 TABLET,CHEWABLE ORAL 4 TIMES DAILY PRN
Status: DISCONTINUED | OUTPATIENT
Start: 2023-04-10 | End: 2023-04-17 | Stop reason: HOSPADM

## 2023-04-10 RX ORDER — MAG HYDROX/ALUMINUM HYD/SIMETH 200-200-20
30 SUSPENSION, ORAL (FINAL DOSE FORM) ORAL 4 TIMES DAILY PRN
Status: DISCONTINUED | OUTPATIENT
Start: 2023-04-10 | End: 2023-04-17 | Stop reason: HOSPADM

## 2023-04-10 RX ORDER — CETIRIZINE HYDROCHLORIDE 10 MG/1
10 TABLET ORAL DAILY
COMMUNITY

## 2023-04-10 RX ADMIN — IOPAMIDOL 100 ML: 755 INJECTION, SOLUTION INTRAVENOUS at 07:04

## 2023-04-10 NOTE — Clinical Note
Diagnosis: Urinary retention [700606]   Admitting Provider:: HALI KO [[id number]]   Future Attending Provider: HALI KO [[id number]]   Reason for IP Medical Treatment  (Clinical interventions that can only be accomplished in the IP setting? ) :: IV   I certify that Inpatient services for greater than or equal to 2 midnights are medically necessary:: Yes   Plans for Post-Acute care--if anticipated (pick the single best option):: F. IP Rehabilitation Unit Placement   Special Needs:: No Special Needs [1]

## 2023-04-10 NOTE — FIRST PROVIDER EVALUATION
"Medical screening examination initiated.  I have conducted a focused provider triage encounter, findings are as follows:    Chief Complaint   Patient presents with    Back Pain     Pt to ED with c/o nontraumatic lower back pain since Friday.      Brief history of present illness:  88 y.o. male presents to the ED with nontraumatic lower back pain onset Friday. Also notes dysuria and difficulty going to bathroom. Denies fall or injury.     Vitals:    04/10/23 1211   BP: (!) 163/80   Pulse: 66   Resp: 18   Temp: 97.2 °F (36.2 °C)   TempSrc: Oral   SpO2: 99%   Weight: 79.4 kg (175 lb)   Height: 5' 10" (1.778 m)       Pertinent physical exam:  Awake, alert, non-labored respirations    Brief workup plan:  labs, UA, XR    Preliminary workup initiated; this workup will be continued and followed by the physician or advanced practice provider that is assigned to the patient when roomed.  "

## 2023-04-10 NOTE — ED NOTES
Pt with pain in lt hip pain worse today; pt to ED from home; wife at bedside; will continue to monitor

## 2023-04-10 NOTE — ED PROVIDER NOTES
Encounter Date: 4/10/2023       History     Chief Complaint   Patient presents with    Back Pain     Pt to ED with c/o nontraumatic lower back pain since Friday.      See MDM    The history is provided by the patient. No  was used.   Review of patient's allergies indicates:  No Known Allergies  Past Medical History:   Diagnosis Date    Diabetes mellitus     Hypercholesterolemia     Hypertension     Stroke      Past Surgical History:   Procedure Laterality Date    INSERTION OF PACEMAKER      KNEE SURGERY Right      History reviewed. No pertinent family history.  Social History     Tobacco Use    Smoking status: Never    Smokeless tobacco: Never   Substance Use Topics    Alcohol use: Not Currently    Drug use: Not Currently     Review of Systems   Constitutional:  Negative for fever.   Respiratory:  Negative for cough and shortness of breath.    Cardiovascular:  Negative for chest pain.   Gastrointestinal:  Negative for abdominal pain.   Genitourinary:  Negative for difficulty urinating and dysuria.   Musculoskeletal:  Positive for back pain. Negative for gait problem.   Skin:  Negative for color change.   Neurological:  Negative for dizziness, speech difficulty and headaches.   Psychiatric/Behavioral:  Negative for hallucinations and suicidal ideas.    All other systems reviewed and are negative.    Physical Exam     Initial Vitals [04/10/23 1211]   BP Pulse Resp Temp SpO2   (!) 163/80 66 18 97.2 °F (36.2 °C) 99 %      MAP       --         Physical Exam    Nursing note and vitals reviewed.  Constitutional: He appears well-developed and well-nourished.   HENT:   Head: Normocephalic.   Eyes: EOM are normal.   Neck: Neck supple.   Normal range of motion.  Cardiovascular:  Normal rate, regular rhythm, normal heart sounds and intact distal pulses.           Pulmonary/Chest: Breath sounds normal.   Abdominal: Abdomen is soft. Bowel sounds are normal.   Musculoskeletal:         General: Normal range of  motion.      Cervical back: Normal range of motion and neck supple.      Comments: Lower back tenderness     Neurological: He is alert and oriented to person, place, and time. He has normal strength.   Skin: Skin is warm and dry. Capillary refill takes less than 2 seconds.   Psychiatric: He has a normal mood and affect. His behavior is normal. Judgment and thought content normal.       ED Course   Procedures  Labs Reviewed   COMPREHENSIVE METABOLIC PANEL - Abnormal; Notable for the following components:       Result Value    Glucose Level 73 (*)     Creatinine 1.48 (*)     Alkaline Phosphatase 159 (*)     Aspartate Aminotransferase 54 (*)     All other components within normal limits   CBC WITH DIFFERENTIAL - Abnormal; Notable for the following components:    Hgb 13.7 (*)     Hct 41.6 (*)     MCHC 32.9 (*)     MPV 10.5 (*)     All other components within normal limits   URINALYSIS, REFLEX TO URINE CULTURE - Normal   URINALYSIS, MICROSCOPIC - Normal   CBC W/ AUTO DIFFERENTIAL    Narrative:     The following orders were created for panel order CBC auto differential.  Procedure                               Abnormality         Status                     ---------                               -----------         ------                     CBC with Differential[[id number]]        Abnormal            Final result                 Please view results for these tests on the individual orders.          Imaging Results              CT Lumbar Spine Without Contrast (Final result)  Result time 04/10/23 17:01:37      Final result by Amanuel Dudley MD (04/10/23 17:01:37)                   Impression:      Mild L3 superior endplate compression deformity is age indeterminate.      Electronically signed by: Amanuel Dudley  Date:    04/10/2023  Time:    17:01               Narrative:    EXAMINATION:  CT LUMBAR SPINE WITHOUT CONTRAST    CLINICAL HISTORY:  L3 spine fracture;    TECHNIQUE:  Helical acquisition through the lumbar spine  without IV contrast.  Three plane reconstructions were provided for review.  mGycm. Automatic exposure control, adjustment of mA/kV or iterative reconstruction technique was used to reduce radiation.    COMPARISON:  No priors    FINDINGS:  No significant alignment abnormality.  There is mild superior endplate compression deformity of L3 with minimal retropulsion.  Minimal L1 superior endplate compression deformity versus Schmorl's node without retropulsion.  No gross spinal canal narrowing.  There are moderate disc and facet degenerative changes.  Some left adrenal thickening is likely adenomatous.  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  Tiny nonobstructing calculus right kidney.  Increased attenuation of the liver which is nonspecific.                                       X-Ray Lumbar Spine Ap And Lateral (Final result)  Result time 04/10/23 15:16:45      Final result by Scott Harris MD (04/10/23 15:16:45)                   Impression:      Age indeterminate L3 compression fracture      Electronically signed by: Scott Harris MD  Date:    04/10/2023  Time:    15:16               Narrative:    EXAMINATION:  Three views lumbar spine    CLINICAL HISTORY:  Low back pain    COMPARISON:  None    FINDINGS:  There is an age-indeterminate superior endplate compression deformity of L3.  Alignment is maintained.                                       Medications - No data to display  Medical Decision Making:   Initial Assessment:   Historian:  Spouse.  Patient is a 88-year-old male  that presents with fall that has been present 2 weeks ago. Associated symptoms lower back pain. Surrounding information is nothing. Exacerbated by nothing. Relieved by nothing. Patient treatment prior to arrival age. Risk factors include none. Other history pertaining to this complaint nothing.   Assessment:  See physical exam.    Differential Diagnosis:   Low back pain, lumbar strain, lumbar strain, lumbar  fracture  ED Management:  History was obtained.  Physical performed.  Patient did have a L3 compression fracture.  I did speak to Neurosurgery.  Recommends a CT scan of lumbar spine with contrast.  He also recommended consult Urology and neurology which I did.  Patient will be admitted to Hospital Medicine.  I did speak to Stormy who accepts patient.  Social determine its effect his healthcare are age.  Family is recommended inpatient rehab.  I did speak to Dr. Lawrence Stephen emergency room physician.  He performed a face-to-face interaction with patient and agrees with admission  Additional MDM:   Lab: I have independently reviewed the lab and note no significant abnormalities.  I compared today's labs with labs from 02/06/2023.  Abnormal:  Creatinine 1.48 today, was 1.61.  Alk-phos 159 today was 167.  AST 54 today was 87.         ED Course as of 04/10/23 1841   Mon Apr 10, 2023   1307 Nurse noted patient had approx 700ml of urine in bladder; will in and out cath patient  [MA]   1628 Bladder scan only 100 ml of uirne [CL]   1836 Spoke to Dr Carlos.  He would like Urology and neurology consulted.  He would also like a CT lumbar spine with contrast. [CL]   1837 Spoke to Marianela with Hospital Medicine accepts admit [CL]      ED Course User Index  [CL] GOLDY Dixon  [MA] Aly La PA-C                 Clinical Impression:   Final diagnoses:  [R33.9] Urinary retention  [S32.030A] Closed compression fracture of L3 lumbar vertebra, initial encounter (Primary)        ED Disposition Condition    Admit Stable                GOLDY Dixon  04/10/23 1842

## 2023-04-11 PROBLEM — R29.6 FALLS FREQUENTLY: Status: ACTIVE | Noted: 2023-04-11

## 2023-04-11 LAB
ALBUMIN SERPL-MCNC: 2.8 G/DL (ref 3.4–4.8)
ALBUMIN/GLOB SERPL: 1.1 RATIO (ref 1.1–2)
ALP SERPL-CCNC: 146 UNIT/L (ref 40–150)
ALT SERPL-CCNC: 44 UNIT/L (ref 0–55)
AST SERPL-CCNC: 48 UNIT/L (ref 5–34)
BASOPHILS # BLD AUTO: 0.03 X10(3)/MCL (ref 0–0.2)
BASOPHILS NFR BLD AUTO: 0.5 %
BILIRUBIN DIRECT+TOT PNL SERPL-MCNC: 0.7 MG/DL
BUN SERPL-MCNC: 20 MG/DL (ref 8.4–25.7)
CALCIUM SERPL-MCNC: 8.9 MG/DL (ref 8.8–10)
CHLORIDE SERPL-SCNC: 103 MMOL/L (ref 98–107)
CO2 SERPL-SCNC: 29 MMOL/L (ref 23–31)
CREAT SERPL-MCNC: 1.47 MG/DL (ref 0.73–1.18)
EOSINOPHIL # BLD AUTO: 0.04 X10(3)/MCL (ref 0–0.9)
EOSINOPHIL NFR BLD AUTO: 0.7 %
ERYTHROCYTE [DISTWIDTH] IN BLOOD BY AUTOMATED COUNT: 16.5 % (ref 11.5–17)
GFR SERPLBLD CREATININE-BSD FMLA CKD-EPI: 46 MLS/MIN/1.73/M2
GLOBULIN SER-MCNC: 2.5 GM/DL (ref 2.4–3.5)
GLUCOSE SERPL-MCNC: 88 MG/DL (ref 82–115)
HCT VFR BLD AUTO: 36.3 % (ref 42–52)
HGB BLD-MCNC: 11.8 G/DL (ref 14–18)
IMM GRANULOCYTES # BLD AUTO: 0.02 X10(3)/MCL (ref 0–0.04)
IMM GRANULOCYTES NFR BLD AUTO: 0.4 %
LYMPHOCYTES # BLD AUTO: 0.84 X10(3)/MCL (ref 0.6–4.6)
LYMPHOCYTES NFR BLD AUTO: 14.7 %
MAGNESIUM SERPL-MCNC: 1.9 MG/DL (ref 1.6–2.6)
MCH RBC QN AUTO: 29.1 PG (ref 27–31)
MCHC RBC AUTO-ENTMCNC: 32.5 G/DL (ref 33–36)
MCV RBC AUTO: 89.6 FL (ref 80–94)
MONOCYTES # BLD AUTO: 0.59 X10(3)/MCL (ref 0.1–1.3)
MONOCYTES NFR BLD AUTO: 10.4 %
NEUTROPHILS # BLD AUTO: 4.18 X10(3)/MCL (ref 2.1–9.2)
NEUTROPHILS NFR BLD AUTO: 73.3 %
NRBC BLD AUTO-RTO: 0 %
PHOSPHATE SERPL-MCNC: 3.7 MG/DL (ref 2.3–4.7)
PLATELET # BLD AUTO: 129 X10(3)/MCL (ref 130–400)
PMV BLD AUTO: 10.3 FL (ref 7.4–10.4)
POCT GLUCOSE: 126 MG/DL (ref 70–110)
POCT GLUCOSE: 134 MG/DL (ref 70–110)
POCT GLUCOSE: 142 MG/DL (ref 70–110)
POTASSIUM SERPL-SCNC: 3.7 MMOL/L (ref 3.5–5.1)
PROT SERPL-MCNC: 5.3 GM/DL (ref 5.8–7.6)
RBC # BLD AUTO: 4.05 X10(6)/MCL (ref 4.7–6.1)
SODIUM SERPL-SCNC: 139 MMOL/L (ref 136–145)
WBC # SPEC AUTO: 5.7 X10(3)/MCL (ref 4.5–11.5)

## 2023-04-11 PROCEDURE — 84100 ASSAY OF PHOSPHORUS: CPT | Performed by: NURSE PRACTITIONER

## 2023-04-11 PROCEDURE — 25000003 PHARM REV CODE 250: Performed by: INTERNAL MEDICINE

## 2023-04-11 PROCEDURE — 83735 ASSAY OF MAGNESIUM: CPT | Performed by: NURSE PRACTITIONER

## 2023-04-11 PROCEDURE — 85025 COMPLETE CBC W/AUTO DIFF WBC: CPT | Performed by: NURSE PRACTITIONER

## 2023-04-11 PROCEDURE — 25000003 PHARM REV CODE 250: Performed by: NURSE PRACTITIONER

## 2023-04-11 PROCEDURE — 80053 COMPREHEN METABOLIC PANEL: CPT | Performed by: NURSE PRACTITIONER

## 2023-04-11 PROCEDURE — 11000001 HC ACUTE MED/SURG PRIVATE ROOM

## 2023-04-11 RX ORDER — SODIUM CHLORIDE 9 MG/ML
INJECTION, SOLUTION INTRAVENOUS CONTINUOUS
Status: DISCONTINUED | OUTPATIENT
Start: 2023-04-11 | End: 2023-04-15

## 2023-04-11 RX ORDER — OXYCODONE HYDROCHLORIDE 5 MG/1
5 TABLET ORAL EVERY 4 HOURS PRN
Status: DISCONTINUED | OUTPATIENT
Start: 2023-04-11 | End: 2023-04-16

## 2023-04-11 RX ORDER — ACETAMINOPHEN 325 MG/1
650 TABLET ORAL EVERY 6 HOURS
Status: DISCONTINUED | OUTPATIENT
Start: 2023-04-11 | End: 2023-04-13

## 2023-04-11 RX ORDER — LABETALOL HYDROCHLORIDE 5 MG/ML
10 INJECTION, SOLUTION INTRAVENOUS
Status: DISCONTINUED | OUTPATIENT
Start: 2023-04-11 | End: 2023-04-17 | Stop reason: HOSPADM

## 2023-04-11 RX ORDER — HYDRALAZINE HYDROCHLORIDE 20 MG/ML
10 INJECTION INTRAMUSCULAR; INTRAVENOUS EVERY 8 HOURS PRN
Status: DISCONTINUED | OUTPATIENT
Start: 2023-04-11 | End: 2023-04-17 | Stop reason: HOSPADM

## 2023-04-11 RX ORDER — CETIRIZINE HYDROCHLORIDE 10 MG/1
10 TABLET ORAL DAILY
Status: DISCONTINUED | OUTPATIENT
Start: 2023-04-11 | End: 2023-04-17 | Stop reason: HOSPADM

## 2023-04-11 RX ORDER — AMIODARONE HYDROCHLORIDE 200 MG/1
200 TABLET ORAL 2 TIMES DAILY
Status: DISCONTINUED | OUTPATIENT
Start: 2023-04-11 | End: 2023-04-17 | Stop reason: HOSPADM

## 2023-04-11 RX ORDER — FUROSEMIDE 20 MG/1
20 TABLET ORAL EVERY MORNING
Status: DISCONTINUED | OUTPATIENT
Start: 2023-04-11 | End: 2023-04-11

## 2023-04-11 RX ORDER — POTASSIUM CHLORIDE 20 MEQ/1
20 TABLET, EXTENDED RELEASE ORAL DAILY
Status: DISCONTINUED | OUTPATIENT
Start: 2023-04-11 | End: 2023-04-17 | Stop reason: HOSPADM

## 2023-04-11 RX ORDER — TAMSULOSIN HYDROCHLORIDE 0.4 MG/1
0.4 CAPSULE ORAL NIGHTLY
Status: DISCONTINUED | OUTPATIENT
Start: 2023-04-11 | End: 2023-04-17 | Stop reason: HOSPADM

## 2023-04-11 RX ORDER — LOSARTAN POTASSIUM 25 MG/1
25 TABLET ORAL 2 TIMES DAILY
Status: DISCONTINUED | OUTPATIENT
Start: 2023-04-11 | End: 2023-04-11

## 2023-04-11 RX ADMIN — AMIODARONE HYDROCHLORIDE 200 MG: 200 TABLET ORAL at 09:04

## 2023-04-11 RX ADMIN — APIXABAN 5 MG: 5 TABLET, FILM COATED ORAL at 09:04

## 2023-04-11 RX ADMIN — TAMSULOSIN HYDROCHLORIDE 0.4 MG: 0.4 CAPSULE ORAL at 09:04

## 2023-04-11 RX ADMIN — THERA TABS 1 TABLET: TAB at 09:04

## 2023-04-11 RX ADMIN — CETIRIZINE HYDROCHLORIDE 10 MG: 10 TABLET, FILM COATED ORAL at 09:04

## 2023-04-11 RX ADMIN — OXYCODONE 5 MG: 5 TABLET ORAL at 12:04

## 2023-04-11 RX ADMIN — POTASSIUM CHLORIDE 20 MEQ: 1500 TABLET, EXTENDED RELEASE ORAL at 09:04

## 2023-04-11 RX ADMIN — MUPIROCIN: 20 OINTMENT TOPICAL at 09:04

## 2023-04-11 RX ADMIN — Medication 6 MG: at 01:04

## 2023-04-11 RX ADMIN — SODIUM CHLORIDE: 9 INJECTION, SOLUTION INTRAVENOUS at 09:04

## 2023-04-11 RX ADMIN — ACETAMINOPHEN 325MG 650 MG: 325 TABLET ORAL at 12:04

## 2023-04-11 RX ADMIN — FUROSEMIDE 20 MG: 20 TABLET ORAL at 06:04

## 2023-04-11 RX ADMIN — HYDROCODONE BITARTRATE AND ACETAMINOPHEN 1 TABLET: 5; 325 TABLET ORAL at 01:04

## 2023-04-11 NOTE — NURSING
Nurses Note -- 4 Eyes      4/11/2023   6:21 AM      Skin assessed during: Admit      [x] No Pressure Injuries Present    []Prevention Measures Documented      [] Yes- Altered Skin Integrity Present or Discovered   [] LDA Added if Not in Epic (Describe Wound)   [] New Altered Skin Integrity was Present on Admit and Documented in LDA   [] Wound Image Taken    Wound Care Consulted? No    Attending Nurse:  Minnie Herndon LPN     Second RN/Staff Member:  Rona Walker RN

## 2023-04-11 NOTE — CONSULTS
[facility] - 9th Floor Med Surg  Neurosurgery  Consult Note    Inpatient consult to Neurosurgery  Consult performed by: MACHELLE Larson-BC  Consult ordered by: GOLDY Dixon      Subjective:     Chief Complaint/Reason for Admission:  Nontraumatic lower back pain for a few days.    History of Present Illness:  This is an 88-year-old  male with past medical history significant for CAD, sick sinus syndrome, CHF, CKD, diabetes type 2, hyperlipidemia, hypertension, CVA who presented to the ED with lower back pain that is becoming progressively worse over about the past week.  Patient is an extremely poor historian and confused.  Wife is at the bedside and also seems a bit confused at times.  She does provide some of the history.  Labs and imaging were performed.  An x-ray of the lumbar spine demonstrated an age indeterminate L3 compression fracture.  CT of the lumbar spine without contrast demonstrated mild L3 superior endplate compression deformity age indeterminate.  During his visit in the emergency department he was noted to have urinary retention and had approximately 700 mL of urine in bladder.  Mendez was placed.  Urology was consulted.  An LSO brace was ordered.  Patient admitted to hospital medicine.  Neurosurgery was consulted.    Urology started Flomax and recommended continuing the Mendez.  They will offer a voiding trial prior to being discharged.  Retention secondary to pain and decreased mobility.      CT lumbar spine with contrast:    FINDINGS:   There is stable appearance of the lumbar spine compared to earlier today with moderate L3 compression deformity with minimal retropulsion.  Milder L1 compression deformity with lucent fracture line seen image 34 series 10.  Moderate spinal canal narrowing L3-L4 and L4-L5 secondary to disc and facet degenerative change.  Other chronic findings discussed in prior report.  No new observations after contrast administration.     CT  lumbar spine without contrast:    Mild L3 superior endplate compression deformities age indeterminate.        PTA Medications   Medication Sig    amiodarone (PACERONE) 200 MG Tab Take 200 mg by mouth 2 (two) times a day.    cetirizine (ZYRTEC) 10 MG tablet Take 10 mg by mouth once daily.    ELIQUIS 5 mg Tab Take 5 mg by mouth 2 (two) times daily.    furosemide (LASIX) 20 MG tablet Take 1 tablet by mouth every morning.    losartan (COZAAR) 25 MG tablet Take 25 mg by mouth 2 (two) times daily.    multivitamin with minerals tablet Take 1 tablet by mouth once daily.    potassium chloride (K-TAB) 20 mEq Take 20 mEq by mouth once daily.       Review of patient's allergies indicates:  No Known Allergies    Past Medical History:   Diagnosis Date    Diabetes mellitus     Hypercholesterolemia     Hypertension     Stroke      Past Surgical History:   Procedure Laterality Date    INSERTION OF PACEMAKER      KNEE SURGERY Right      Family History    None       Tobacco Use    Smoking status: Never    Smokeless tobacco: Never   Substance and Sexual Activity    Alcohol use: Not Currently    Drug use: Not Currently    Sexual activity: Not on file     Review of Systems   Unable to perform ROS: Mental status change   Objective:     Weight: 79.4 kg (175 lb)  Body mass index is 25.11 kg/m².  Vital Signs (Most Recent):  Temp: 97.6 °F (36.4 °C) (04/11/23 1126)  Pulse: 61 (04/11/23 1126)  Resp: 18 (04/11/23 1244)  BP: 130/72 (04/11/23 1126)  SpO2: (!) 92 % (04/11/23 1126)   Vital Signs (24h Range):  Temp:  [97.4 °F (36.3 °C)-98.1 °F (36.7 °C)] 97.6 °F (36.4 °C)  Pulse:  [61-72] 61  Resp:  [15-21] 18  SpO2:  [92 %-96 %] 92 %  BP: (114-192)/() 130/72                              Urethral Catheter 04/10/23 1941 Silicone 16 Fr. (Active)   Site Assessment Clean;Intact 04/11/23 0800   Collection Container Standard drainage bag 04/11/23 0800   Securement Method secured to top of thigh w/ adhesive device 04/11/23 0800   Catheter Care  "Performed yes 04/11/23 0800   Reason for Continuing Urinary Catheterization Urinary retention 04/11/23 0800   CAUTI Prevention Bundle Securement Device in place with 1" slack;Intact seal between catheter & drainage tubing;Drainage bag/urimeter off the floor;Sheeting clip in use;No dependent loops or kinks;Drainage bag/urimeter not overfilled (<2/3 full);Drainage bag/urimeter below bladder 04/11/23 0800   Output (mL) 950 mL 04/11/23 0200       Physical Exam:  Nursing note and vitals reviewed.    Constitutional: He appears well-developed and well-nourished. He is not diaphoretic. No distress.     Eyes: Pupils are equal, round, and reactive to light. Conjunctivae and EOM are normal.     Cardiovascular: Normal rate and normal pulses.     Abdominal: Soft. Bowel sounds are normal.     Skin: Skin displays no rash on trunk and no rash on extremities. Skin displays no lesions on trunk and no lesions on extremities.     Psych/Behavior: He is alert. He is oriented to person, place, and time. He has a normal mood and affect.     Musculoskeletal:        Right Upper Extremities: Muscle strength is 4/5.        Left Upper Extremities: Muscle strength is 4/5.       Right Lower Extremities: Muscle strength is 4/5.        Left Lower Extremities: Muscle strength is 4/5.      Comments: Overall deconditioning.     Neurological:        Sensory:   Unable to adequately participate in this exam.       DTRs: He displays no Babinski's sign on the right side. He displays no Babinski's sign on the left side.        Cranial nerves: Cranial nerve(s) II, III, IV, V, VI, VII, VIII, IX, X, XI and XII are intact.   Confusion.  Wife states this is the patient's baseline.    Opens eyes.  PERRLA bilateral brisk.  Moves all extremities but appears chronically deconditioned and weak.  3+/4- to all 4 extremities.    Unable to participate in sensory examination.    Negative Babinski        Significant Labs:  Recent Labs   Lab 04/10/23  1333 04/11/23  0416 "    139   K 4.1 3.7   CO2 30 29   BUN 20.7 20.0   CREATININE 1.48* 1.47*   CALCIUM 9.2 8.9   MG  --  1.90     Recent Labs   Lab 04/10/23  1328 04/11/23  0416   WBC 7.1 5.7   HGB 13.7* 11.8*   HCT 41.6* 36.3*    129*     No results for input(s): LABPT, INR, APTT in the last 48 hours.  Microbiology Results (last 7 days)       ** No results found for the last 168 hours. **            Assessment/Plan:    Multiple falls over the past couple of weeks as well as a history of falls.    L1 and L3 compression fracture.  Age indeterminate.    Lumbar spinal stenosis.    Urinary retention  Mild ANMOL versus CKD.      Pain control  PTOT   LSO brace   Fall precautions  SCDs   No acute neurosurgical interventions.      Further recommendations to follow.           There are no hospital problems to display for this patient.      Thank you for your consult. I will follow-up with patient. Please contact us if you have any additional questions.    EMILIA LarsonFree Hospital for Women-BC  Neurosurgery  [facility] - 9th Floor Med Surg

## 2023-04-11 NOTE — CONSULTS
[facility] - 9th Floor Med Surg  Neurology  Consult Note    Patient Name: Philip Mayes  MRN: [id number]  Admission Date: 4/10/2023  Hospital Length of Stay: 1 days  Code Status: Full Code   Attending Provider: Lili Menezes MD   Consulting Provider: Sharon Tucker [facility]  Primary Care Physician: Alexandre Toscano MD  Principal Problem:<principal problem not specified>    Inpatient consult to neurology  Consult performed by: GOLDY Gates  Consult ordered by: GOLDY Dixon         Subjective:     Chief Complaint:       HPI:   87 y/o M with PMH CAD, SSS, a fib on eliquis, CHF, CKD, DM II, HLD, HTN, and 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  Patient was noted to have some urinary retention on initial ED presentation,  approximately 700 mL of urine in bladder. urology has been consulted.    X-ray lumbar spine revealed age indeterminate L3 compression fracture.  CT L-spine without revealed mild L3 superior endplate compression deformity age indeterminate.  Labs significant for creatinine 1.47, AST 40, otherwise unremarkable.       Past Medical History:   Diagnosis Date    Diabetes mellitus     Hypercholesterolemia     Hypertension     Stroke        Past Surgical History:   Procedure Laterality Date    INSERTION OF PACEMAKER      KNEE SURGERY Right        Review of patient's allergies indicates:  No Known Allergies    Current Neurological Medications:     No current facility-administered medications on file prior to encounter.     Current Outpatient Medications on File Prior to Encounter   Medication Sig    amiodarone (PACERONE) 200 MG Tab Take 200 mg by mouth 2 (two) times a day.    cetirizine (ZYRTEC) 10 MG tablet Take 10 mg by mouth once daily.    ELIQUIS 5 mg Tab Take 5 mg by  mouth 2 (two) times daily.    furosemide (LASIX) 20 MG tablet Take 1 tablet by mouth every morning.    losartan (COZAAR) 25 MG tablet Take 25 mg by mouth 2 (two) times daily.    multivitamin with minerals tablet Take 1 tablet by mouth once daily.    potassium chloride (K-TAB) 20 mEq Take 20 mEq by mouth once daily.     Family History    None       Tobacco Use    Smoking status: Never    Smokeless tobacco: Never   Substance and Sexual Activity    Alcohol use: Not Currently    Drug use: Not Currently    Sexual activity: Not on file     Review of Systems  A 14pt ros was reviewed & is negative unless o/w documented in the hpi    Objective:     Vital Signs (Most Recent):  Temp: 97.6 °F (36.4 °C) (04/11/23 1126)  Pulse: 61 (04/11/23 1126)  Resp: 18 (04/11/23 1244)  BP: 130/72 (04/11/23 1126)  SpO2: (!) 92 % (04/11/23 1126)   Vital Signs (24h Range):  Temp:  [97.4 °F (36.3 °C)-98.1 °F (36.7 °C)] 97.6 °F (36.4 °C)  Pulse:  [61-72] 61  Resp:  [15-21] 18  SpO2:  [92 %-96 %] 92 %  BP: (114-192)/() 130/72     Weight: 79.4 kg (175 lb)  Body mass index is 25.11 kg/m².    Physical Exam  Vitals reviewed.   Constitutional:       General: He is awake.      Appearance: Normal appearance.   HENT:      Head: Normocephalic and atraumatic.      Nose: Nose normal.      Mouth/Throat:      Mouth: Mucous membranes are moist.      Pharynx: Oropharynx is clear.   Eyes:      Extraocular Movements: Extraocular movements intact and EOM normal.      Pupils: Pupils are equal, round, and reactive to light.   Pulmonary:      Effort: Pulmonary effort is normal.   Musculoskeletal:      Cervical back: Normal range of motion.   Skin:     General: Skin is warm and dry.   Neurological:      Mental Status: He is alert and oriented to person, place, and time.      Deep Tendon Reflexes:      Reflex Scores:       Bicep reflexes are 2+ on the right side and 2+ on the left side.       Brachioradialis reflexes are 2+ on the right side and 2+ on the  left side.       Patellar reflexes are 2+ on the right side and 2+ on the left side.       Achilles reflexes are 2+ on the right side and 2+ on the left side.  Psychiatric:         Speech: Speech normal.         Behavior: Behavior is cooperative.       NEUROLOGICAL EXAMINATION:     MENTAL STATUS   Oriented to person, place, and time.   Follows 1 step commands.   Attention: normal. Concentration: normal.   Speech: speech is normal   Level of consciousness: alert    CRANIAL NERVES     CN II   Visual fields full to confrontation.     CN III, IV, VI   Pupils are equal, round, and reactive to light.  Extraocular motions are normal.   Nystagmus: none   Conjugate gaze: present    MOTOR EXAM     Strength   Right deltoid: 3/5  Left deltoid: 4/5  Right biceps: 3/5  Left biceps: 4/5  Right triceps: 3/5  Left triceps: 4/5  Right quadriceps: 3/5  Left quadriceps: 4/5  Right hamstring: 3/5  Left hamstrin/5  Right glutei: 3/5  Left glutei: 4/5  Right anterior tibial: 3/5  Left anterior tibial: 4/5  Right posterior tibial: 3/5  Left posterior tibial: 4/5    REFLEXES     Reflexes   Right brachioradialis: 2+  Left brachioradialis: 2+  Right biceps: 2+  Left biceps: 2+  Right patellar: 2+  Left patellar: 2+  Right achilles: 2+  Left achilles: 2+  Right plantar: normal  Left plantar: normal  Right Miles: absent  Left Miles: absent  Right ankle clonus: absent  Left ankle clonus: absent    SENSORY EXAM   Light touch normal.     Significant Labs:   Recent Lab Results         23  1130   23  0416   04/10/23  2056        Albumin/Globulin Ratio   1.1         Albumin   2.8         Alkaline Phosphatase   146         ALT   44         AST   48         Baso #   0.03         Basophil %   0.5         BILIRUBIN TOTAL   0.7         BUN   20.0         Calcium   8.9         Chloride   103         CO2   29         Creatinine   1.47         eGFR   46         Eos #   0.04         Eosinophil %   0.7         Globulin, Total   2.5          Glucose   88         Hematocrit   36.3         Hemoglobin   11.8         Immature Grans (Abs)   0.02         Immature Granulocytes   0.4         Lymph #   0.84         LYMPH %   14.7         Magnesium   1.90         MCH   29.1         MCHC   32.5         MCV   89.6         Mono #   0.59         Mono %   10.4         MPV   10.3         Neut #   4.18         Neut %   73.3         nRBC   0.0         Phosphorus   3.7         Platelets   129         POCT Glucose 134     73       Potassium   3.7         PROTEIN TOTAL   5.3         RBC   4.05         RDW   16.5         Sodium   139         WBC   5.7                 Significant Imaging: I have reviewed all pertinent imaging results/findings within the past 24 hours.3    Assessment and Plan:     Falls frequently  L3 compression fracture and urinary retention    -MRI brain, c and l spine w w/o  -fall precautions        VTE Risk Mitigation (From admission, onward)         Ordered     apixaban tablet 5 mg  2 times daily         04/11/23 0017     Reason for No Pharmacological VTE Prophylaxis  Once        Question:  Reasons:  Answer:  Already adequately anticoagulated on oral Anticoagulants    04/10/23 2048     IP VTE HIGH RISK PATIENT  Once         04/10/23 2048     Place sequential compression device  Until discontinued         04/10/23 2048                Thank you for your consult. Further recommendations to follow per MD Sharon Tucker, NANCI-BC  Inpatient Neurology  [facility] - 9th Floor Med Surg

## 2023-04-11 NOTE — ED NOTES
Pt resting in hospital bed with back brace on; pt with no s/s of resp or other distress noted; pena catheter to gravity drainage bag

## 2023-04-11 NOTE — SUBJECTIVE & OBJECTIVE
Past Medical History:   Diagnosis Date    Diabetes mellitus     Hypercholesterolemia     Hypertension     Stroke        Past Surgical History:   Procedure Laterality Date    INSERTION OF PACEMAKER      KNEE SURGERY Right        Review of patient's allergies indicates:  No Known Allergies    Current Neurological Medications:     No current facility-administered medications on file prior to encounter.     Current Outpatient Medications on File Prior to Encounter   Medication Sig    amiodarone (PACERONE) 200 MG Tab Take 200 mg by mouth 2 (two) times a day.    cetirizine (ZYRTEC) 10 MG tablet Take 10 mg by mouth once daily.    ELIQUIS 5 mg Tab Take 5 mg by mouth 2 (two) times daily.    furosemide (LASIX) 20 MG tablet Take 1 tablet by mouth every morning.    losartan (COZAAR) 25 MG tablet Take 25 mg by mouth 2 (two) times daily.    multivitamin with minerals tablet Take 1 tablet by mouth once daily.    potassium chloride (K-TAB) 20 mEq Take 20 mEq by mouth once daily.     Family History    None       Tobacco Use    Smoking status: Never    Smokeless tobacco: Never   Substance and Sexual Activity    Alcohol use: Not Currently    Drug use: Not Currently    Sexual activity: Not on file     Review of Systems  A 14pt ros was reviewed & is negative unless o/w documented in the hpi    Objective:     Vital Signs (Most Recent):  Temp: 97.6 °F (36.4 °C) (04/11/23 1126)  Pulse: 61 (04/11/23 1126)  Resp: 18 (04/11/23 1244)  BP: 130/72 (04/11/23 1126)  SpO2: (!) 92 % (04/11/23 1126)   Vital Signs (24h Range):  Temp:  [97.4 °F (36.3 °C)-98.1 °F (36.7 °C)] 97.6 °F (36.4 °C)  Pulse:  [61-72] 61  Resp:  [15-21] 18  SpO2:  [92 %-96 %] 92 %  BP: (114-192)/() 130/72     Weight: 79.4 kg (175 lb)  Body mass index is 25.11 kg/m².    Physical Exam  Vitals reviewed.   Constitutional:       General: He is awake.      Appearance: Normal appearance.   HENT:      Head: Normocephalic and atraumatic.      Nose: Nose normal.      Mouth/Throat:       Mouth: Mucous membranes are moist.      Pharynx: Oropharynx is clear.   Eyes:      Extraocular Movements: Extraocular movements intact and EOM normal.      Pupils: Pupils are equal, round, and reactive to light.   Pulmonary:      Effort: Pulmonary effort is normal.   Musculoskeletal:      Cervical back: Normal range of motion.   Skin:     General: Skin is warm and dry.   Neurological:      Mental Status: He is alert and oriented to person, place, and time.      Deep Tendon Reflexes:      Reflex Scores:       Bicep reflexes are 2+ on the right side and 2+ on the left side.       Brachioradialis reflexes are 2+ on the right side and 2+ on the left side.       Patellar reflexes are 2+ on the right side and 2+ on the left side.       Achilles reflexes are 2+ on the right side and 2+ on the left side.  Psychiatric:         Speech: Speech normal.         Behavior: Behavior is cooperative.       NEUROLOGICAL EXAMINATION:     MENTAL STATUS   Oriented to person, place, and time.   Follows 1 step commands.   Attention: normal. Concentration: normal.   Speech: speech is normal   Level of consciousness: alert    CRANIAL NERVES     CN II   Visual fields full to confrontation.     CN III, IV, VI   Pupils are equal, round, and reactive to light.  Extraocular motions are normal.   Nystagmus: none   Conjugate gaze: present    MOTOR EXAM     Strength   Right deltoid: 3/5  Left deltoid: 4/5  Right biceps: 3/5  Left biceps: 4/5  Right triceps: 3/5  Left triceps: 4/5  Right quadriceps: 3/5  Left quadriceps: 4/5  Right hamstring: 3/5  Left hamstrin/5  Right glutei: 3/5  Left glutei: 4/5  Right anterior tibial: 3/5  Left anterior tibial: 4/5  Right posterior tibial: 3/5  Left posterior tibial: 4/5    REFLEXES     Reflexes   Right brachioradialis: 2+  Left brachioradialis: 2+  Right biceps: 2+  Left biceps: 2+  Right patellar: 2+  Left patellar: 2+  Right achilles: 2+  Left achilles: 2+  Right plantar: normal  Left plantar:  normal  Right Miles: absent  Left Miles: absent  Right ankle clonus: absent  Left ankle clonus: absent    SENSORY EXAM   Light touch normal.     Significant Labs:   Recent Lab Results         04/11/23  1130   04/11/23  0416   04/10/23  2056        Albumin/Globulin Ratio   1.1         Albumin   2.8         Alkaline Phosphatase   146         ALT   44         AST   48         Baso #   0.03         Basophil %   0.5         BILIRUBIN TOTAL   0.7         BUN   20.0         Calcium   8.9         Chloride   103         CO2   29         Creatinine   1.47         eGFR   46         Eos #   0.04         Eosinophil %   0.7         Globulin, Total   2.5         Glucose   88         Hematocrit   36.3         Hemoglobin   11.8         Immature Grans (Abs)   0.02         Immature Granulocytes   0.4         Lymph #   0.84         LYMPH %   14.7         Magnesium   1.90         MCH   29.1         MCHC   32.5         MCV   89.6         Mono #   0.59         Mono %   10.4         MPV   10.3         Neut #   4.18         Neut %   73.3         nRBC   0.0         Phosphorus   3.7         Platelets   129         POCT Glucose 134     73       Potassium   3.7         PROTEIN TOTAL   5.3         RBC   4.05         RDW   16.5         Sodium   139         WBC   5.7                 Significant Imaging: I have reviewed all pertinent imaging results/findings within the past 24 hours.3

## 2023-04-11 NOTE — CONSULTS
Philip Mayes 6/26/1934  [id number]  4/11/2023    CONSULTING PHYSICIAN: GOLDY Dixon    Reason for consultation: Urinary retention    HPI:  Patient is an 88-year-old male with a past medical history of CAD, sick sinus syndrome, CHF, CKD, diabetes mellitus, hyperlipidemia, hypertension and CVA who presented to the emergency room yesterday with complaints of progressive lower back pain following a fall 2 weeks ago.  Imaging performed on adamant reveals an L3 superior endplate fracture, L1 compression deformity as well as degenerative changes resulting in central stenosis at L3-4, L4-5.  He was noted to have urinary retention while in the emergency room with about 700 mL urine and bladder.  Pena catheter was placed.  Urology has been consulted for urinary retention.  He has been afebrile since admission, some hypotension.  950 mL urine output overnight.  Lab work this morning reveals WBC 5.7, H&H 11.8/36.3, BUN/creatinine 20/1.47 (stable compared to lab work on admission); UA with clear yellow urine, negative nitrites, negative leukocyte, less than 5 RBC and WBC, no bacteria. Patient denies any history of urinary issues/retention. Denies any discomfort from pena.    Past Medical History:   Diagnosis Date    Diabetes mellitus     Hypercholesterolemia     Hypertension     Stroke      Past Surgical History:   Procedure Laterality Date    INSERTION OF PACEMAKER      KNEE SURGERY Right      History reviewed. No pertinent family history.    Social History     Tobacco Use    Smoking status: Never    Smokeless tobacco: Never   Substance Use Topics    Alcohol use: Not Currently    Drug use: Not Currently     Current Facility-Administered Medications   Medication Dose Route Frequency Provider Last Rate Last Admin    acetaminophen tablet 650 mg  650 mg Oral Q6H PRN Coleen Mayes, AGACNP-BC        aluminum-magnesium hydroxide-simethicone 200-200-20 mg/5 mL suspension 30 mL  30 mL Oral QID PRN Coleen Mayes,  AGACNP-BC        amiodarone tablet 200 mg  200 mg Oral BID Coleen Mayes, AGACNP-BC        apixaban tablet 5 mg  5 mg Oral BID Coleen Mayes, AGACNP-BC        cetirizine tablet 10 mg  10 mg Oral Daily Coleen Mayes, AGACNP-BC        dextrose 10% bolus 125 mL 125 mL  12.5 g Intravenous PRN Coleen Mayes, AGACNP-BC        dextrose 10% bolus 250 mL 250 mL  25 g Intravenous PRN Coleen Mayes, AGACNP-BC        furosemide tablet 20 mg  20 mg Oral QAM Coleen Mayes, AGACNP-BC   20 mg at 04/11/23 0629    glucagon (human recombinant) injection 1 mg  1 mg Intramuscular PRN Coleen Mayes, AGACNP-BC        glucose chewable tablet 16 g  16 g Oral PRN Coleen Mayes, AGACNP-BC        glucose chewable tablet 32 g  32 g Oral PRN Coleen Mayes, AGACNP-BC        hydrALAZINE injection 10 mg  10 mg Intravenous Q8H PRN Coleen Mayes, AGACNP-BC        HYDROcodone-acetaminophen 5-325 mg per tablet 1 tablet  1 tablet Oral Q4H PRN Coleen Mayes, AGACNP-BC   1 tablet at 04/11/23 0150    insulin aspart U-100 injection 1-10 Units  1-10 Units Subcutaneous QID (AC + HS) PRN Coleen Mayes, AGACNP-BC        labetaloL injection 10 mg  10 mg Intravenous Q2H PRN Coleen Mayes AGACNP-BC        losartan tablet 25 mg  25 mg Oral BID Coleen Mayes, AGACNP-BC        melatonin tablet 6 mg  6 mg Oral Nightly PRN Coleen Mayes, AGACNP-BC   6 mg at 04/11/23 0151    multivitamin tablet  1 tablet Oral Daily Coleen Mayes AGACNP-BC        mupirocin 2 % ointment   Nasal BID Lili Menezes MD        naloxone 0.4 mg/mL injection 0.02 mg  0.02 mg Intravenous PRN Coleen Mayes, AGACNP-BC        ondansetron injection 4 mg  4 mg Intravenous Q4H PRN Coleen Mayes, AGACNP-BC        polyethylene glycol packet 17 g  17 g Oral BID PRN Coleen Mayes, AGACNP-BC        potassium chloride SA CR tablet 20 mEq  20 mEq Oral Daily Coleen Mayes, AGACNP-BC        prochlorperazine injection Soln 5 mg  5 mg Intravenous Q6H PRN Coleen Mayes,  "[facility]        simethicone chewable tablet 80 mg  1 tablet Oral QID CAMERON Mayes [facility]         Review of patient's allergies indicates:  No Known Allergies    ROS: 12 point review of systems negative other than the HPI    PHYSICAL EXAM:  Vitals:    04/10/23 2340 04/11/23 0045 04/11/23 0150 04/11/23 0629   BP:  (!) 144/76  (!) 144/76   Pulse:  63     Resp:  18 18    Temp: 97.9 °F (36.6 °C) 98.1 °F (36.7 °C)     TempSrc: Oral      SpO2:  (!) 92%     Weight:  79.4 kg (175 lb)     Height:  5' 10" (1.778 m)         Intake/Output Summary (Last 24 hours) at 4/11/2023 0708  Last data filed at 4/11/2023 0200  Gross per 24 hour   Intake --   Output 950 ml   Net -950 ml     GEN: WN/WD NAD  HEENT: NCAT, PERRLA, EOMI, OP clear, nares patent  CV: RRR  RESP: Even and unlabored  ABD: soft, NTND  : clear yellow urine draining to  bag  EXT: no C/C/E  NEURO: no focal deficits, MAEW, AAOx4    LABS:  Recent Results (from the past 24 hour(s))   Urinalysis, Reflex to Urine Culture    Collection Time: 04/10/23  1:09 PM    Specimen: Urine   Result Value Ref Range    Color, UA Yellow Yellow, Light-Yellow, Dark Yellow, Nithya, Straw    Appearance, UA Clear Clear    Specific Gravity, UA 1.010 1.005 - 1.030    pH, UA 6.5 5.0 - 8.5    Protein, UA Negative Negative mg/dL    Glucose, UA Negative Negative, Normal mg/dL    Ketones, UA Negative Negative mg/dL    Blood, UA Negative Negative unit/L    Bilirubin, UA Negative Negative mg/dL    Urobilinogen, UA 0.2 0.2, 1.0, Normal mg/dL    Nitrites, UA Negative Negative    Leukocyte Esterase, UA Negative Negative unit/L   Urinalysis, Microscopic    Collection Time: 04/10/23  1:09 PM   Result Value Ref Range    RBC, UA <5 <=5 /HPF    WBC, UA <5 <=5 /HPF    Squamous Epithelial Cells, UA <5 <=5 /HPF    Bacteria, UA None Seen None Seen, Rare, Occasional /HPF   CBC with Differential    Collection Time: 04/10/23  1:28 PM   Result Value Ref Range    WBC 7.1 4.5 - 11.5 x10(3)/mcL    RBC 4.70 " 4.70 - 6.10 x10(6)/mcL    Hgb 13.7 (L) 14.0 - 18.0 g/dL    Hct 41.6 (L) 42.0 - 52.0 %    MCV 88.5 80.0 - 94.0 fL    MCH 29.1 27.0 - 31.0 pg    MCHC 32.9 (L) 33.0 - 36.0 g/dL    RDW 16.3 11.5 - 17.0 %    Platelet 146 130 - 400 x10(3)/mcL    MPV 10.5 (H) 7.4 - 10.4 fL    Neut % 80.2 %    Lymph % 10.2 %    Mono % 8.4 %    Eos % 0.3 %    Basophil % 0.6 %    Lymph # 0.73 0.6 - 4.6 x10(3)/mcL    Neut # 5.73 2.1 - 9.2 x10(3)/mcL    Mono # 0.60 0.1 - 1.3 x10(3)/mcL    Eos # 0.02 0 - 0.9 x10(3)/mcL    Baso # 0.04 0 - 0.2 x10(3)/mcL    IG# 0.02 0 - 0.04 x10(3)/mcL    IG% 0.3 %    NRBC% 0.0 %   Comprehensive metabolic panel    Collection Time: 04/10/23  1:33 PM   Result Value Ref Range    Sodium Level 142 136 - 145 mmol/L    Potassium Level 4.1 3.5 - 5.1 mmol/L    Chloride 105 98 - 107 mmol/L    Carbon Dioxide 30 23 - 31 mmol/L    Glucose Level 73 (L) 82 - 115 mg/dL    Blood Urea Nitrogen 20.7 8.4 - 25.7 mg/dL    Creatinine 1.48 (H) 0.73 - 1.18 mg/dL    Calcium Level Total 9.2 8.8 - 10.0 mg/dL    Protein Total 6.5 5.8 - 7.6 gm/dL    Albumin Level 3.4 3.4 - 4.8 g/dL    Globulin 3.1 2.4 - 3.5 gm/dL    Albumin/Globulin Ratio 1.1 1.1 - 2.0 ratio    Bilirubin Total 0.9 <=1.5 mg/dL    Alkaline Phosphatase 159 (H) 40 - 150 unit/L    Alanine Aminotransferase 53 0 - 55 unit/L    Aspartate Aminotransferase 54 (H) 5 - 34 unit/L    eGFR 45 mls/min/1.73/m2   POCT glucose    Collection Time: 04/10/23  8:56 PM   Result Value Ref Range    POCT Glucose 73 70 - 110 mg/dL   Comprehensive Metabolic Panel (CMP)    Collection Time: 04/11/23  4:16 AM   Result Value Ref Range    Sodium Level 139 136 - 145 mmol/L    Potassium Level 3.7 3.5 - 5.1 mmol/L    Chloride 103 98 - 107 mmol/L    Carbon Dioxide 29 23 - 31 mmol/L    Glucose Level 88 82 - 115 mg/dL    Blood Urea Nitrogen 20.0 8.4 - 25.7 mg/dL    Creatinine 1.47 (H) 0.73 - 1.18 mg/dL    Calcium Level Total 8.9 8.8 - 10.0 mg/dL    Protein Total 5.3 (L) 5.8 - 7.6 gm/dL    Albumin Level 2.8 (L) 3.4 -  4.8 g/dL    Globulin 2.5 2.4 - 3.5 gm/dL    Albumin/Globulin Ratio 1.1 1.1 - 2.0 ratio    Bilirubin Total 0.7 <=1.5 mg/dL    Alkaline Phosphatase 146 40 - 150 unit/L    Alanine Aminotransferase 44 0 - 55 unit/L    Aspartate Aminotransferase 48 (H) 5 - 34 unit/L    eGFR 46 mls/min/1.73/m2   Magnesium    Collection Time: 04/11/23  4:16 AM   Result Value Ref Range    Magnesium Level 1.90 1.60 - 2.60 mg/dL   Phosphorus    Collection Time: 04/11/23  4:16 AM   Result Value Ref Range    Phosphorus Level 3.7 2.3 - 4.7 mg/dL   CBC with Differential    Collection Time: 04/11/23  4:16 AM   Result Value Ref Range    WBC 5.7 4.5 - 11.5 x10(3)/mcL    RBC 4.05 (L) 4.70 - 6.10 x10(6)/mcL    Hgb 11.8 (L) 14.0 - 18.0 g/dL    Hct 36.3 (L) 42.0 - 52.0 %    MCV 89.6 80.0 - 94.0 fL    MCH 29.1 27.0 - 31.0 pg    MCHC 32.5 (L) 33.0 - 36.0 g/dL    RDW 16.5 11.5 - 17.0 %    Platelet 129 (L) 130 - 400 x10(3)/mcL    MPV 10.3 7.4 - 10.4 fL    Neut % 73.3 %    Lymph % 14.7 %    Mono % 10.4 %    Eos % 0.7 %    Basophil % 0.5 %    Lymph # 0.84 0.6 - 4.6 x10(3)/mcL    Neut # 4.18 2.1 - 9.2 x10(3)/mcL    Mono # 0.59 0.1 - 1.3 x10(3)/mcL    Eos # 0.04 0 - 0.9 x10(3)/mcL    Baso # 0.03 0 - 0.2 x10(3)/mcL    IG# 0.02 0 - 0.04 x10(3)/mcL    IG% 0.4 %    NRBC% 0.0 %       IMAGING:  None    ASSESSMENT:  -urinary retention s/p pena   -L1, L3 compression fracture, age indeterminate  -degenerative lumbar stenosis    PLAN:  Retention likely s/t pain/decreased mobility. Recommend continuing pena. Flomax started. Will follow loosely. If he is here at the end of the week, can offer voiding trial prior to discharge. If he is discharged sooner than this, he can f/u in 1-2 weeks in the office for voiding trial.     Nithya Herbert, [facility]-BC

## 2023-04-11 NOTE — PROGRESS NOTES
[facility]  Hospital Medicine Progress Note      Chief Complaint   Back Pain (Pt to ED with c/o nontraumatic lower back pain since Friday. )     History of Present Illness   Mr. Mayes is a very poor historian, so information was gathered mostly from the medical record as there is no one at the bedside to assist with history.  He is an 88-year-old male with a PMH of CAD, SSS, CHF, CKD, DM 2, HLD, HTN, CVA who presented to the ED with complaints of low back pain getting progressively worse after a fall 2 weeks ago.  He is unsure if he has seen a medical professional regarding the back pain or the fall until today.  He states the pain is worse with movement.  He is unable to give any other specifics.     Today's ED lab work was at patient's baseline.  XR lumbar spine showed age indeterminate L3 compression fracture.  CT L-spine without contrast showed mild L3 superior endplate compression deformity is age indeterminate.  CT L-spine with contrast showed moderate L3 compression deformity with minimal retropulsion.  Mild L1 compression deformity with lucent fracture line seen.  Moderate spinal canal narrowing L3-L4 and L4-L5 secondary to disc and facet degenerative change.  During his ED stay he was noted to have urinary retention, holding approximately 700 mL of urine.  Mendez catheter placed.  He was afebrile, VSS on RA.  Neurosurgery, Neurology, Urology were all consulted in the ED. LSO brace was placed per neurosurgery recommendation.  He was admitted to Hospital Medicine for management.    Patient admitted due to low back pain.  Reports a history of multiple falls.  Further imaging revealed L1 and L3 compression fracture.  Neurosurgery has been consulted awaiting recs.  Also with urinary retention status post indwelling Mendez placed, urology also consulted     Today's information  Patient seen at bedside.  Wife at bedside   Patient complaining of low back pain.    Vitals are stable on  room air   Labs reviewed significant for creatinine of 1.47, will suspend Lasix and losartan, avoid nephrotoxic, begin IV normal saline at 50 cc/hour and trend BMP   Follow-up with Neurosurgery input, Urology input,    Exam  General: Awake, alert, oriented, in no acute distress, non-toxic appearing. Appears comfortable, currently eating chips.  HEENT: NC/AT  Neck:  Supple  Chest: Clear bilaterally, no wheezing or rales, no accessory muscle use   CVS: Regular rhythm. Normal S1/S2.  Abdomen: nondistended, normoactive BS, soft and non-tender.  MSK: No obvious deformity or joint swelling. LSO brace in place  Skin: Warm and dry  Neuro: AAOx3, no focal neurological deficit  Psych: Cooperative       Assessment & Plan   Status post multiple falls   L1 and L3 compression fracture   Spinal stenosis L3-L4 and L4-L5 region  Urinary retention status post indwelling Mendez   Mild ANMOL versus CKD      History:  CAD, SSS, CHF, CKD, DM 2, HLD, HTN, CVA     Plan:  Pain control prn    will suspend Lasix and losartan, avoid nephrotoxic, begin IV normal saline at 50 cc/hour and trend BMP   Follow-up with Neurosurgery input, Urology input,  -LSO brace in place  -Mendez catheter in place  -Sliding scale insulin with accu-checks  -Antihypertensives as needed  -Resume home meds as appropriate  -Labs in AM  Will consult PT after neurosurgery evaluation and management     VTE Prophylaxis: Continue home Eliquis       VITAL SIGNS: 24 HRS MIN & MAX LAST   Temp  Min: 97.2 °F (36.2 °C)  Max: 98.1 °F (36.7 °C) 97.6 °F (36.4 °C)   BP  Min: 114/61  Max: 192/100 130/72   Pulse  Min: 61  Max: 72  61   Resp  Min: 15  Max: 21 18   SpO2  Min: 92 %  Max: 99 % (!) 92 %         Recent Labs   Lab 04/10/23  1328 04/11/23  0416   WBC 7.1 5.7   RBC 4.70 4.05*   HGB 13.7* 11.8*   HCT 41.6* 36.3*   MCV 88.5 89.6   MCH 29.1 29.1   MCHC 32.9* 32.5*   RDW 16.3 16.5    129*   MPV 10.5* 10.3       Recent Labs   Lab 04/10/23  1333 04/11/23  0416    139   K 4.1  3.7   CO2 30 29   BUN 20.7 20.0   CREATININE 1.48* 1.47*   CALCIUM 9.2 8.9   MG  --  1.90   ALBUMIN 3.4 2.8*   ALKPHOS 159* 146   ALT 53 44   AST 54* 48*   BILITOT 0.9 0.7          Microbiology Results (last 7 days)       ** No results found for the last 168 hours. **             See below for Radiology    Scheduled Med:   amiodarone  200 mg Oral BID    apixaban  5 mg Oral BID    cetirizine  10 mg Oral Daily    multivitamin  1 tablet Oral Daily    mupirocin   Nasal BID    potassium chloride  20 mEq Oral Daily    tamsulosin  0.4 mg Oral QHS        Continuous Infusions:   sodium chloride 0.9% 50 mL/hr at 04/11/23 0921        PRN Meds:  acetaminophen, aluminum-magnesium hydroxide-simethicone, dextrose 10%, dextrose 10%, glucagon (human recombinant), glucose, glucose, hydrALAZINE, HYDROcodone-acetaminophen, insulin aspart U-100, labetaloL, melatonin, naloxone, ondansetron, polyethylene glycol, prochlorperazine, simethicone         VTE prophylaxis:     Patient condition:  Stable/Fair/Guarded/ Serious/ Critical    Anticipated discharge and Disposition:         All diagnosis and differential diagnosis have been reviewed; assessment and plan has been documented; I have personally reviewed the labs and test results that are presently available; I have reviewed the patients medication list; I have reviewed the consulting providers response and recommendations. I have reviewed or attempted to review medical records based upon their availability    All of the patient's questions have been  addressed and answered. Patient's is agreeable to the above stated plan. I will continue to monitor closely and make adjustments to medical management as needed.  _____________________________________________________________________    Nutrition Status:    Radiology:  CT Lumbar Spine With Contrast  Narrative: EXAMINATION:  CT LUMBAR SPINE WITH CONTRAST    CLINICAL HISTORY:  L3 compression fracture;    TECHNIQUE:  Helical acquisition through  the lumbar spine with IV contrast.  Three plane reconstructions were provided for review.  mGycm. Automatic exposure control, adjustment of mA/kV or iterative reconstruction technique was used to reduce radiation.    COMPARISON:  Earlier today    FINDINGS:  There is stable appearance of the lumbar spine compared to earlier today with moderate L3 compression deformity with minimal retropulsion.  Milder L1 compression deformity with lucent fracture line seen image 34 series 10.  Moderate spinal canal narrowing L3-L4 and L4-L5 secondary to disc and facet degenerative change.  Other chronic findings discussed in prior report.  No new observations after contrast administration.  Impression: As above.    Electronically signed by: Amanuel Dudley  Date:    04/10/2023  Time:    19:27  CT Lumbar Spine Without Contrast  Narrative: EXAMINATION:  CT LUMBAR SPINE WITHOUT CONTRAST    CLINICAL HISTORY:  L3 spine fracture;    TECHNIQUE:  Helical acquisition through the lumbar spine without IV contrast.  Three plane reconstructions were provided for review.  mGycm. Automatic exposure control, adjustment of mA/kV or iterative reconstruction technique was used to reduce radiation.    COMPARISON:  No priors    FINDINGS:  No significant alignment abnormality.  There is mild superior endplate compression deformity of L3 with minimal retropulsion.  Minimal L1 superior endplate compression deformity versus Schmorl's node without retropulsion.  No gross spinal canal narrowing.  There are moderate disc and facet degenerative changes.  Some left adrenal thickening is likely adenomatous.  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  Tiny nonobstructing calculus right kidney.  Increased attenuation of the liver which is nonspecific.  Impression: Mild L3 superior endplate compression deformity is age indeterminate.    Electronically signed by: Amanuel Dudley  Date:    04/10/2023  Time:    17:01  X-Ray Lumbar Spine Ap  And Lateral  Narrative: EXAMINATION:  Three views lumbar spine    CLINICAL HISTORY:  Low back pain    COMPARISON:  None    FINDINGS:  There is an age-indeterminate superior endplate compression deformity of L3.  Alignment is maintained.  Impression: Age indeterminate L3 compression fracture    Electronically signed by: Scott Harris MD  Date:    04/10/2023  Time:    15:16      Thuy Lacy MD   04/11/2023

## 2023-04-11 NOTE — ASSESSMENT & PLAN NOTE
L3 compression fracture and urinary retention    -MRI brain, c and l spine w w/o  -fall precautions

## 2023-04-11 NOTE — H&P
[facility] Hospital Medicine   History & Physical Note      Patient Name: Philip Mayes  : 1934  MRN: [id number]  Patient Class: IP- Inpatient   Admission Date: 4/10/2023   Length of Stay: 0  Admitting Physician:  Service  Attending Physician: Lili Menezes MD  PCP: Alexandre Toscano MD  Source of history: Patient, patient's family, and EMR.   Face-to-Face encounter date: 04/10/2023  Code status: --Full      Chief Complaint   Back Pain (Pt to ED with c/o nontraumatic lower back pain since Friday. )    History of Present Illness   Mr. Mayes is a very poor historian, so information was gathered mostly from the medical record as there is no one at the bedside to assist with history.  He is an 88-year-old male with a PMH of CAD, SSS, CHF, CKD, DM 2, HLD, HTN, CVA who presented to the ED with complaints of low back pain getting progressively worse after a fall 2 weeks ago.  He is unsure if he has seen a medical professional regarding the back pain or the fall until today.  He states the pain is worse with movement.  He is unable to give any other specifics.    Today's ED lab work was at patient's baseline.  XR lumbar spine showed age indeterminate L3 compression fracture.  CT L-spine without contrast showed mild L3 superior endplate compression deformity is age indeterminate.  CT L-spine with contrast showed moderate L3 compression deformity with minimal retropulsion.  Mild L1 compression deformity with lucent fracture line seen.  Moderate spinal canal narrowing L3-L4 and L4-L5 secondary to disc and facet degenerative change.  During his ED stay he was noted to have urinary retention, holding approximately 700 mL of urine.  Mendez catheter placed.  He was afebrile, VSS on RA.  Neurosurgery, Neurology, Urology were all consulted in the ED. LSO brace was placed per neurosurgery recommendation.  He was admitted to Hospital Medicine for management.    ROS   Except as documented, all  other systems reviewed and negative     Past Medical History     CAD  PAF on Eliquis  SSS s/p Pacemaker  CHF (unknown EF)  CKD stage 3B  Diabetes mellitus type II  Hypertension  Hyperlipidemia  CVA  Right Iliac aortic aneurysm a/p coiling      Past Surgical History     Appendectomy  Bilateral CEA  PPM placement  Eye surgery  Bilateral knee replacement  Right iliac aortic aneurysm coiling    Social History     Social History     Tobacco Use    Smoking status: Never    Smokeless tobacco: Never   Substance Use Topics    Alcohol use: Not Currently        Family History   Reviewed and negative    Allergies   Patient has no known allergies.    Home Medications     Prior to Admission medications    Medication Sig Start Date End Date Taking? Authorizing Provider   amiodarone (PACERONE) 200 MG Tab Take 200 mg by mouth 2 (two) times a day. 1/12/22  Yes Historical Provider   cetirizine (ZYRTEC) 10 MG tablet Take 10 mg by mouth once daily.   Yes Historical Provider   ELIQUIS 5 mg Tab Take 5 mg by mouth 2 (two) times daily. 4/6/23  Yes Historical Provider   furosemide (LASIX) 20 MG tablet Take 1 tablet by mouth every morning. 3/23/23 3/22/24 Yes Historical Provider   losartan (COZAAR) 25 MG tablet Take 25 mg by mouth 2 (two) times daily. 4/7/23  Yes Historical Provider   multivitamin with minerals tablet Take 1 tablet by mouth once daily.   Yes Historical Provider   potassium chloride (K-TAB) 20 mEq Take 20 mEq by mouth once daily. 4/6/23  Yes Historical Provider       Physical Exam   Vital Signs  Temp:  [97.2 °F (36.2 °C)]   Pulse:  [66]   Resp:  [18]   BP: (163)/(80)   SpO2:  [99 %]       General: Awake, alert, oriented, in no acute distress, non-toxic appearing. Appears comfortable, currently eating chips.  HEENT: NC/AT  Neck:  Supple  Chest: Clear bilaterally, no wheezing or rales, no accessory muscle use   CVS: Regular rhythm. Normal S1/S2.  Abdomen: nondistended, normoactive BS, soft and non-tender.  MSK: No obvious  deformity or joint swelling. LSO brace in place  Skin: Warm and dry  Neuro: AAOx3, no focal neurological deficit  Psych: Cooperative    Labs     Recent Labs     04/10/23  1328   WBC 7.1   RBC 4.70   HGB 13.7*   HCT 41.6*   MCV 88.5   MCH 29.1   MCHC 32.9*   RDW 16.3           Recent Labs     04/10/23  1333      K 4.1   CHLORIDE 105   CO2 30   BUN 20.7   CREATININE 1.48*   GLUCOSE 73*   CALCIUM 9.2   ALBUMIN 3.4   GLOBULIN 3.1   ALKPHOS 159*   ALT 53   AST 54*   BILITOT 0.9        Microbiology Results (last 7 days)       ** No results found for the last 168 hours. **           Imaging     CT Lumbar Spine With Contrast   Final Result      As above.         Electronically signed by: Amanuel Dudley   Date:    04/10/2023   Time:    19:27      CT Lumbar Spine Without Contrast   Final Result      Mild L3 superior endplate compression deformity is age indeterminate.         Electronically signed by: Amanuel Dudley   Date:    04/10/2023   Time:    17:01      X-Ray Lumbar Spine Ap And Lateral   Final Result      Age indeterminate L3 compression fracture         Electronically signed by: Scott Harris MD   Date:    04/10/2023   Time:    15:16        Assessment & Plan   Ground level fall resulting in L3 compression fracture  Urinary retention    History:  CAD, SSS, CHF, CKD, DM 2, HLD, HTN, CVA    Plan:  -Neurosurgery, Neurology, Urology consults placed in ED  -LSO brace in place  -Mendez catheter in place  -Pain control  -Sliding scale insulin with accu-checks  -Antihypertensives as needed  -Resume home meds as appropriate  -Labs in AM      VTE Prophylaxis: Continue home Coleen Bower, RICARDA have reviewed and discussed this case with Dr. Menezes.  Please see addendum for further assessment and plan from attending MD.  ----------------------    Lili Menezes MD  I performed the substantive portion of this visit. I had a face-to-face time with the patient on [04/10/2023]. I reviewed the nurse practitioner's  history, physical exam and plan of care.      History:  88-year-old male present to the ED complaining of back pain after a fall about 2 weeks ago, since then reports the pain has been persistent in his lower back worse with movement, denies bowel or bladder incontinence.    Physical exam:  Power equal in bilateral lower extremities    Medical decision making:  He had difficulty urinating in the ED and bladder scan showed more than 700 mL for which Mendez catheter was placed.  Imaging noted for compression fracture deformities L3, given persistent pain might consider kyphoplasty, neurosurgery consulted.  Continue pain control at this time.  Possibly will start voiding trial in the next  24-48 hrs

## 2023-04-11 NOTE — HPI
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  Patient was noted to have some urinary retention on initial ED presentation,  approximately 700 mL of urine in bladder. urology has been consulted.    X-ray lumbar spine revealed age indeterminate L3 compression fracture.  CT L-spine without revealed mild L3 superior endplate compression deformity age indeterminate.  Labs significant for creatinine 1.47, AST 40, otherwise unremarkable.

## 2023-04-12 LAB
ANION GAP SERPL CALC-SCNC: 7 MEQ/L
BUN SERPL-MCNC: 22.2 MG/DL (ref 8.4–25.7)
CALCIUM SERPL-MCNC: 8.4 MG/DL (ref 8.8–10)
CHLORIDE SERPL-SCNC: 101 MMOL/L (ref 98–107)
CO2 SERPL-SCNC: 27 MMOL/L (ref 23–31)
CREAT SERPL-MCNC: 1.36 MG/DL (ref 0.73–1.18)
CREAT/UREA NIT SERPL: 16
GFR SERPLBLD CREATININE-BSD FMLA CKD-EPI: 50 MLS/MIN/1.73/M2
GLUCOSE SERPL-MCNC: 122 MG/DL (ref 82–115)
POCT GLUCOSE: 113 MG/DL (ref 70–110)
POCT GLUCOSE: 125 MG/DL (ref 70–110)
POCT GLUCOSE: 132 MG/DL (ref 70–110)
POTASSIUM SERPL-SCNC: 4.5 MMOL/L (ref 3.5–5.1)
SODIUM SERPL-SCNC: 135 MMOL/L (ref 136–145)

## 2023-04-12 PROCEDURE — 97162 PT EVAL MOD COMPLEX 30 MIN: CPT

## 2023-04-12 PROCEDURE — G0378 HOSPITAL OBSERVATION PER HR: HCPCS

## 2023-04-12 PROCEDURE — 25000003 PHARM REV CODE 250: Performed by: NURSE PRACTITIONER

## 2023-04-12 PROCEDURE — 80048 BASIC METABOLIC PNL TOTAL CA: CPT | Performed by: INTERNAL MEDICINE

## 2023-04-12 PROCEDURE — 25000003 PHARM REV CODE 250: Performed by: INTERNAL MEDICINE

## 2023-04-12 PROCEDURE — 97530 THERAPEUTIC ACTIVITIES: CPT

## 2023-04-12 RX ORDER — POLYETHYLENE GLYCOL 3350 17 G/17G
17 POWDER, FOR SOLUTION ORAL DAILY
Status: DISCONTINUED | OUTPATIENT
Start: 2023-04-12 | End: 2023-04-13

## 2023-04-12 RX ADMIN — POTASSIUM CHLORIDE 20 MEQ: 1500 TABLET, EXTENDED RELEASE ORAL at 09:04

## 2023-04-12 RX ADMIN — AMIODARONE HYDROCHLORIDE 200 MG: 200 TABLET ORAL at 08:04

## 2023-04-12 RX ADMIN — SODIUM CHLORIDE: 9 INJECTION, SOLUTION INTRAVENOUS at 05:04

## 2023-04-12 RX ADMIN — POLYETHYLENE GLYCOL 3350 17 G: 17 POWDER, FOR SOLUTION ORAL at 08:04

## 2023-04-12 RX ADMIN — MUPIROCIN: 20 OINTMENT TOPICAL at 09:04

## 2023-04-12 RX ADMIN — APIXABAN 5 MG: 5 TABLET, FILM COATED ORAL at 09:04

## 2023-04-12 RX ADMIN — ACETAMINOPHEN 325MG 650 MG: 325 TABLET ORAL at 05:04

## 2023-04-12 RX ADMIN — TAMSULOSIN HYDROCHLORIDE 0.4 MG: 0.4 CAPSULE ORAL at 08:04

## 2023-04-12 RX ADMIN — CETIRIZINE HYDROCHLORIDE 10 MG: 10 TABLET, FILM COATED ORAL at 09:04

## 2023-04-12 RX ADMIN — APIXABAN 5 MG: 5 TABLET, FILM COATED ORAL at 08:04

## 2023-04-12 RX ADMIN — THERA TABS 1 TABLET: TAB at 09:04

## 2023-04-12 RX ADMIN — MUPIROCIN: 20 OINTMENT TOPICAL at 08:04

## 2023-04-12 RX ADMIN — AMIODARONE HYDROCHLORIDE 200 MG: 200 TABLET ORAL at 09:04

## 2023-04-12 RX ADMIN — ACETAMINOPHEN 325MG 650 MG: 325 TABLET ORAL at 01:04

## 2023-04-12 RX ADMIN — ACETAMINOPHEN 325MG 650 MG: 325 TABLET ORAL at 12:04

## 2023-04-12 RX ADMIN — OXYCODONE 5 MG: 5 TABLET ORAL at 01:04

## 2023-04-12 RX ADMIN — ACETAMINOPHEN 325MG 650 MG: 325 TABLET ORAL at 11:04

## 2023-04-12 RX ADMIN — ACETAMINOPHEN 325MG 650 MG: 325 TABLET ORAL at 08:04

## 2023-04-12 RX ADMIN — OXYCODONE 5 MG: 5 TABLET ORAL at 12:04

## 2023-04-12 NOTE — PROGRESS NOTES
[facility]  Hospital Medicine Progress Note           Chief Complaint   Back Pain (Pt to ED with c/o nontraumatic lower back pain since Friday. )     History of Present Illness   Mr. Mayes is a very poor historian, so information was gathered mostly from the medical record as there is no one at the bedside to assist with history.  He is an 88-year-old male with a PMH of CAD, SSS, CHF, CKD, DM 2, HLD, HTN, CVA who presented to the ED with complaints of low back pain getting progressively worse after a fall 2 weeks ago.  He is unsure if he has seen a medical professional regarding the back pain or the fall until today.  He states the pain is worse with movement.  He is unable to give any other specifics.     Today's ED lab work was at patient's baseline.  XR lumbar spine showed age indeterminate L3 compression fracture.  CT L-spine without contrast showed mild L3 superior endplate compression deformity is age indeterminate.  CT L-spine with contrast showed moderate L3 compression deformity with minimal retropulsion.  Mild L1 compression deformity with lucent fracture line seen.  Moderate spinal canal narrowing L3-L4 and L4-L5 secondary to disc and facet degenerative change.  During his ED stay he was noted to have urinary retention, holding approximately 700 mL of urine.  Pena catheter placed.  He was afebrile, VSS on RA.  Neurosurgery, Neurology, Urology were all consulted in the ED. LSO brace was placed per neurosurgery recommendation.  He was admitted to Hospital Medicine for management.     Patient admitted due to low back pain.  Reports a history of multiple falls.  Further imaging revealed L1 and L3 compression fracture.  Neurosurgery has been consulted awaiting recs.  Also with urinary retention status post indwelling Pena placed, urology also consulted - to continue indwelling pena, flomax.     Today's information  Patient seen at bedside.   Patient has no new complaints today    Vitals reviewed and stable   Neurosurgery evaluated no surgical interventions necessary to continue use of LSO brace when out of bed  Pain control as needed   Follow-up MRI brain, C-spine, L-spine with and without ordered by Neurology for further workup   will suspend Lasix and losartan, avoid nephrotoxic, continue V normal saline at 50 cc/hour and trend BMP        Exam  General: Awake, alert, oriented, in no acute distress, non-toxic appearing. Appears comfortable, currently eating chips.  HEENT: NC/AT  Neck:  Supple  Chest: Clear bilaterally, no wheezing or rales, no accessory muscle use   CVS: Regular rhythm. Normal S1/S2.  Abdomen: nondistended, normoactive BS, soft and non-tender.  MSK: No obvious deformity or joint swelling. LSO brace in place  Skin: Warm and dry  Neuro: AAOx3, no focal neurological deficit  Psych: Cooperative        Assessment & Plan   Status post multiple falls   L1 and L3 compression fracture - age indeterminate   Spinal stenosis L3-L4 and L4-L5 region  Urinary retention status post indwelling Mendez   Mild ANMOL versus CKD      History:  CAD, SSS, CHF, CKD, DM 2, HLD, HTN, CVA     Plan:  Vitals reviewed and stable   Neurosurgery evaluated no surgical interventions necessary to continue use of LSO brace when out of bed  Pain control as needed   Follow-up MRI brain, C-spine, L-spine with and without ordered by Neurology for further workup   will suspend Lasix and losartan, avoid nephrotoxic, continue V normal saline at 50 cc/hour and trend BMP   LSO brace in place  Mendez catheter in place  Sliding scale insulin with accu-checks  Antihypertensives as needed  Resume home meds as appropriate  PT/OT     VTE Prophylaxis: Continue home Eliquis         VITAL SIGNS: 24 HRS MIN & MAX LAST   Temp  Min: 97.4 °F (36.3 °C)  Max: 97.8 °F (36.6 °C) 97.6 °F (36.4 °C)   BP  Min: 105/61  Max: 149/71 105/61   Pulse  Min: 63  Max: 68  65   Resp  Min: 16  Max: 18 17   SpO2  Min: 90 %  Max: 93 % (!) 93 %          Recent Labs   Lab 04/10/23  1328 04/11/23  0416   WBC 7.1 5.7   RBC 4.70 4.05*   HGB 13.7* 11.8*   HCT 41.6* 36.3*   MCV 88.5 89.6   MCH 29.1 29.1   MCHC 32.9* 32.5*   RDW 16.3 16.5    129*   MPV 10.5* 10.3       Recent Labs   Lab 04/10/23  1333 04/11/23  0416 04/12/23  0453    139 135*   K 4.1 3.7 4.5   CO2 30 29 27   BUN 20.7 20.0 22.2   CREATININE 1.48* 1.47* 1.36*   CALCIUM 9.2 8.9 8.4*   MG  --  1.90  --    ALBUMIN 3.4 2.8*  --    ALKPHOS 159* 146  --    ALT 53 44  --    AST 54* 48*  --    BILITOT 0.9 0.7  --           Microbiology Results (last 7 days)       ** No results found for the last 168 hours. **             See below for Radiology    Scheduled Med:   acetaminophen  650 mg Oral Q6H    amiodarone  200 mg Oral BID    apixaban  5 mg Oral BID    cetirizine  10 mg Oral Daily    multivitamin  1 tablet Oral Daily    mupirocin   Nasal BID    potassium chloride  20 mEq Oral Daily    tamsulosin  0.4 mg Oral QHS        Continuous Infusions:   sodium chloride 0.9% 50 mL/hr at 04/12/23 0549        PRN Meds:  acetaminophen, aluminum-magnesium hydroxide-simethicone, dextrose 10%, dextrose 10%, glucagon (human recombinant), glucose, glucose, hydrALAZINE, insulin aspart U-100, labetaloL, melatonin, naloxone, ondansetron, oxyCODONE, polyethylene glycol, prochlorperazine, simethicone       VTE prophylaxis:     Patient condition:  Stable/Fair/Guarded/ Serious/ Critical    Anticipated discharge and Disposition:         All diagnosis and differential diagnosis have been reviewed; assessment and plan has been documented; I have personally reviewed the labs and test results that are presently available; I have reviewed the patients medication list; I have reviewed the consulting providers response and recommendations. I have reviewed or attempted to review medical records based upon their availability    All of the patient's questions have been  addressed and answered. Patient's is agreeable to the above  stated plan. I will continue to monitor closely and make adjustments to medical management as needed.  _____________________________________________________________________    Nutrition Status:    Radiology:  CT Lumbar Spine With Contrast  Narrative: EXAMINATION:  CT LUMBAR SPINE WITH CONTRAST    CLINICAL HISTORY:  L3 compression fracture;    TECHNIQUE:  Helical acquisition through the lumbar spine with IV contrast.  Three plane reconstructions were provided for review.  mGycm. Automatic exposure control, adjustment of mA/kV or iterative reconstruction technique was used to reduce radiation.    COMPARISON:  Earlier today    FINDINGS:  There is stable appearance of the lumbar spine compared to earlier today with moderate L3 compression deformity with minimal retropulsion.  Milder L1 compression deformity with lucent fracture line seen image 34 series 10.  Moderate spinal canal narrowing L3-L4 and L4-L5 secondary to disc and facet degenerative change.  Other chronic findings discussed in prior report.  No new observations after contrast administration.  Impression: As above.    Electronically signed by: Amanuel Dudley  Date:    04/10/2023  Time:    19:27  CT Lumbar Spine Without Contrast  Narrative: EXAMINATION:  CT LUMBAR SPINE WITHOUT CONTRAST    CLINICAL HISTORY:  L3 spine fracture;    TECHNIQUE:  Helical acquisition through the lumbar spine without IV contrast.  Three plane reconstructions were provided for review.  mGycm. Automatic exposure control, adjustment of mA/kV or iterative reconstruction technique was used to reduce radiation.    COMPARISON:  No priors    FINDINGS:  No significant alignment abnormality.  There is mild superior endplate compression deformity of L3 with minimal retropulsion.  Minimal L1 superior endplate compression deformity versus Schmorl's node without retropulsion.  No gross spinal canal narrowing.  There are moderate disc and facet degenerative changes.  Some left adrenal  thickening is likely adenomatous.  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  Tiny nonobstructing calculus right kidney.  Increased attenuation of the liver which is nonspecific.  Impression: Mild L3 superior endplate compression deformity is age indeterminate.    Electronically signed by: Amanuel Dudley  Date:    04/10/2023  Time:    17:01  X-Ray Lumbar Spine Ap And Lateral  Narrative: EXAMINATION:  Three views lumbar spine    CLINICAL HISTORY:  Low back pain    COMPARISON:  None    FINDINGS:  There is an age-indeterminate superior endplate compression deformity of L3.  Alignment is maintained.  Impression: Age indeterminate L3 compression fracture    Electronically signed by: Scott Harris MD  Date:    04/10/2023  Time:    15:16      Thuy Lacy MD   04/12/2023

## 2023-04-12 NOTE — PROGRESS NOTES
[facility] - 9th Floor Med Surg  Neurosurgery  Progress note    Consults  Subjective:     Interval history: Sitting up in the chair with the assistance of physical therapy.  Wife at bedside.  Still awaiting MRI brain, cervical, lumbar.  Patient is still with lower back pain wearing the LSO brace.    History of Present Illness:  This is an 88-year-old  male with past medical history significant for CAD, sick sinus syndrome, CHF, CKD, diabetes type 2, hyperlipidemia, hypertension, CVA who presented to the ED with lower back pain that is becoming progressively worse over about the past week.  Patient is an extremely poor historian and confused.  Wife is at the bedside and also seems a bit confused at times.  She does provide some of the history.  Labs and imaging were performed.  An x-ray of the lumbar spine demonstrated an age indeterminate L3 compression fracture.  CT of the lumbar spine without contrast demonstrated mild L3 superior endplate compression deformity age indeterminate.  During his visit in the emergency department he was noted to have urinary retention and had approximately 700 mL of urine in bladder.  Mendez was placed.  Urology was consulted.  An LSO brace was ordered.  Patient admitted to hospital medicine.  Neurosurgery was consulted.    Urology started Flomax and recommended continuing the Mendez.  They will offer a voiding trial prior to being discharged.  Retention secondary to pain and decreased mobility.      CT lumbar spine with contrast:    FINDINGS:   There is stable appearance of the lumbar spine compared to earlier today with moderate L3 compression deformity with minimal retropulsion.  Milder L1 compression deformity with lucent fracture line seen image 34 series 10.  Moderate spinal canal narrowing L3-L4 and L4-L5 secondary to disc and facet degenerative change.  Other chronic findings discussed in prior report.  No new observations after contrast administration.      CT lumbar spine without contrast:    Mild L3 superior endplate compression deformities age indeterminate.        PTA Medications   Medication Sig    amiodarone (PACERONE) 200 MG Tab Take 200 mg by mouth 2 (two) times a day.    cetirizine (ZYRTEC) 10 MG tablet Take 10 mg by mouth once daily.    ELIQUIS 5 mg Tab Take 5 mg by mouth 2 (two) times daily.    furosemide (LASIX) 20 MG tablet Take 1 tablet by mouth every morning.    losartan (COZAAR) 25 MG tablet Take 25 mg by mouth 2 (two) times daily.    multivitamin with minerals tablet Take 1 tablet by mouth once daily.    potassium chloride (K-TAB) 20 mEq Take 20 mEq by mouth once daily.       Review of patient's allergies indicates:  No Known Allergies    Past Medical History:   Diagnosis Date    Diabetes mellitus     Hypercholesterolemia     Hypertension     Stroke      Past Surgical History:   Procedure Laterality Date    INSERTION OF PACEMAKER      KNEE SURGERY Right      Family History    None       Tobacco Use    Smoking status: Never    Smokeless tobacco: Never   Substance and Sexual Activity    Alcohol use: Not Currently    Drug use: Not Currently    Sexual activity: Not on file     Review of Systems   Unable to perform ROS: Mental status change   Objective:     Weight: 79.4 kg (175 lb)  Body mass index is 25.11 kg/m².  Vital Signs (Most Recent):  Temp: 97.6 °F (36.4 °C) (04/12/23 1124)  Pulse: 65 (04/12/23 1124)  Resp: 16 (04/12/23 0130)  BP: 105/61 (04/12/23 1124)  SpO2: (!) 93 % (04/12/23 1124)   Vital Signs (24h Range):  Temp:  [97.4 °F (36.3 °C)-97.8 °F (36.6 °C)] 97.6 °F (36.4 °C)  Pulse:  [63-68] 65  Resp:  [16-18] 16  SpO2:  [90 %-93 %] 93 %  BP: (105-149)/(61-74) 105/61                              Urethral Catheter 04/10/23 1941 Silicone 16 Fr. (Active)   Site Assessment Clean;Intact 04/11/23 0800   Collection Container Standard drainage bag 04/11/23 0800   Securement Method secured to top of thigh w/ adhesive device 04/11/23 0800   Catheter Care  "Performed yes 04/11/23 0800   Reason for Continuing Urinary Catheterization Urinary retention 04/11/23 0800   CAUTI Prevention Bundle Securement Device in place with 1" slack;Intact seal between catheter & drainage tubing;Drainage bag/urimeter off the floor;Sheeting clip in use;No dependent loops or kinks;Drainage bag/urimeter not overfilled (<2/3 full);Drainage bag/urimeter below bladder 04/11/23 0800   Output (mL) 950 mL 04/11/23 0200       Physical Exam:  Nursing note and vitals reviewed.    Constitutional: He appears well-developed and well-nourished. He is not diaphoretic. No distress.     Eyes: Pupils are equal, round, and reactive to light. Conjunctivae and EOM are normal.     Cardiovascular: Normal rate and normal pulses.     Abdominal: Soft. Bowel sounds are normal.     Skin: Skin displays no rash on trunk and no rash on extremities. Skin displays no lesions on trunk and no lesions on extremities.     Psych/Behavior: He is alert. He is oriented to person, place, and time. He has a normal mood and affect.     Musculoskeletal:        Right Upper Extremities: Muscle strength is 4/5.        Left Upper Extremities: Muscle strength is 4/5.       Right Lower Extremities: Muscle strength is 4/5.        Left Lower Extremities: Muscle strength is 4/5.      Comments: Overall deconditioning.     Neurological:        Sensory:   Unable to adequately participate in this exam.       DTRs: He displays no Babinski's sign on the right side. He displays no Babinski's sign on the left side.        Cranial nerves: Cranial nerve(s) II, III, IV, V, VI, VII, VIII, IX, X, XI and XII are intact.   Some confusion at baseline.  Wife states this is the patient's baseline.    Opens eyes.  PERRLA bilateral brisk.  He knows his name and where he is.  Moves all extremities but appears chronically deconditioned and weak.  3+/4- to all 4 extremities.    Unable to participate in sensory examination.    Negative Babinski        Significant " Labs:  Recent Labs   Lab 04/10/23  1333 04/11/23  0416 04/12/23  0453    139 135*   K 4.1 3.7 4.5   CO2 30 29 27   BUN 20.7 20.0 22.2   CREATININE 1.48* 1.47* 1.36*   CALCIUM 9.2 8.9 8.4*   MG  --  1.90  --        Recent Labs   Lab 04/10/23  1328 04/11/23  0416   WBC 7.1 5.7   HGB 13.7* 11.8*   HCT 41.6* 36.3*    129*       No results for input(s): LABPT, INR, APTT in the last 48 hours.  Microbiology Results (last 7 days)       ** No results found for the last 168 hours. **            Assessment/Plan:    Multiple falls over the past couple of weeks as well as a history of falls.    L1 and L3 compression fracture.  Age indeterminate.    Lumbar spinal stenosis.    Urinary retention  Mild ANMOL versus CKD.  Old CVA with right-sided residual deficits.  Deconditioning.      Pain control  PTOT   LSO brace   Fall precautions  SCDs   No acute neurosurgical interventions.    Awaiting MRI brain, cervical, lumbar.  Neurology on board as well.           Active Diagnoses:    Diagnosis Date Noted POA    Falls frequently [R29.6] 04/11/2023 Not Applicable      Problems Resolved During this Admission:         Thank you for your consult. I will follow-up with patient. Please contact us if you have any additional questions.    Carlie Swain, [facility]  Neurosurgery  [facility] - 9th Floor Med Surg

## 2023-04-12 NOTE — PT/OT/SLP EVAL
Physical Therapy Evaluation    Patient Name:  Philip Mayes   MRN:  [id number]    Recommendations:     Discharge Recommendations: nursing facility, skilled   Discharge Equipment Recommendations: none   Barriers to discharge:  safety awareness    Assessment:     Philip Mayes is a 88 y.o. male admitted with low back pain. Patient suffered fall several weeks ago and pain has progressively worsened. Found to have a L3 compression fracture. Neurosurgery recommending conservative treatment with LSO. Patient is [facility]. He lives with spouse in [facility] with ramp. He requires assistance with ADL's and spends most of his time in . He has not walked much in 2 years. At time of PT evaluation, patient presenting with the following impairments/functional limitations: weakness, impaired endurance, impaired self care skills, impaired functional mobility, gait instability, impaired balance, decreased safety awareness, pain. He required MOD A for bed mobility. MOD A for sit<>stand. Ambulated 25 ft with RW & CGA. Anterior trunk lean. Poor safety awareness. He would benefit from SNF placement at discharge to address the above deficits. Progress patient as tolerated.     Rehab Prognosis: Good; patient would benefit from acute skilled PT services to address these deficits and reach maximum level of function.    Recent Surgery: * No surgery found *      Plan:     During this hospitalization, patient to be seen 5 x/week to address the identified rehab impairments via gait training, therapeutic activities, therapeutic exercises, neuromuscular re-education and progress toward the following goals:    Plan of Care Expires:  05/12/23    Subjective     Chief Complaint: pain  Patient/Family Comments/goals: none  Pain/Comfort:  Pain Rating 1: 4/10  Location 1:  (back)  Pain Addressed 1: Distraction, Reposition  Pain Rating Post-Intervention 1: 4/10    Patients cultural, spiritual, Baptism conflicts given the current situation: no    Living  Environment:  Lives with spouse in [facility] with ramp  Prior to admission, patient required assistance with ADL's. Equipment used at home: wheelchair, grab bar, walker, rolling.  DME owned (not currently used): rolling walker.  Upon discharge, patient will have assistance from TBD.    Objective:     Communicated with nurse prior to session.  Patient found supine with telemetry, peripheral IV  upon PT entry to room.    General Precautions: Standard, fall  Orthopedic Precautions:spinal precautions   Braces: LSO  Respiratory Status: Room air    Exams:  Cognitive Exam:  Patient is oriented to Person, Place, Time, and Situation  Sensation:    -       Intact  RLE ROM: WFL  RLE Strength: -4/5 grossly  LLE ROM: WFL  LLE Strength: -4/5 grossly    Functional Mobility:  Bed Mobility:     Supine to Sit: moderate assistance  Transfers:     Sit to Stand:  moderate assistance with rolling walker  Gait: 25 ft with RW & CGA. Forward trunk lean  Balance: poor      AM-PAC 6 CLICK MOBILITY  Total Score:14     Patient left up in chair with all lines intact, call button in reach, spouse present, and educated on calling for assistance when ready to return to bed .    GOALS:   Multidisciplinary Problems       Physical Therapy Goals          Problem: Physical Therapy    Goal Priority Disciplines Outcome Goal Variances Interventions   Physical Therapy Goal     PT, PT/OT Ongoing, Progressing     Description: Goals to be met by: 23     Patient will increase functional independence with mobility by performin. Supine to sit with Stand-by Assistance  2. Sit to supine with Stand-by Assistance  3. Sit to stand transfer with Contact Guard Assistance  4. Gait  x 200 feet with Contact Guard Assistance using Rolling Walker.                          History:     Past Medical History:   Diagnosis Date    Diabetes mellitus     Hypercholesterolemia     Hypertension     Stroke        Past Surgical History:   Procedure Laterality Date    INSERTION OF  PACEMAKER      KNEE SURGERY Right        Time Tracking:     PT Received On: 04/12/23  PT Start Time: 1028     PT Stop Time: 1048  PT Total Time (min): 20 min     Billable Minutes: Evaluation 20 minutes      04/12/2023

## 2023-04-12 NOTE — PT/OT/SLP PROGRESS
Physical Therapy Treatment    Patient Name:  Philip Mayes   MRN:  [id number]    Recommendations:     Discharge Recommendations: nursing facility, skilled  Discharge Equipment Recommendations: none  Barriers to discharge:  safety awareness    Assessment:     Philip Mayes is a 88 y.o. male admitted with a medical diagnosis of <principal problem not specified>.  He presents with the following impairments/functional limitations: weakness, impaired endurance, impaired self care skills, impaired functional mobility, gait instability, impaired balance, decreased safety awareness.    Rehab Prognosis: Good; patient would benefit from acute skilled PT services to address these deficits and reach maximum level of function.    Recent Surgery: * No surgery found *      Plan:     During this hospitalization, patient to be seen 5 x/week to address the identified rehab impairments via neuromuscular re-education, therapeutic exercises, therapeutic activities, gait training and progress toward the following goals:    Plan of Care Expires:  05/12/23    Subjective     Chief Complaint: pain  Patient/Family Comments/goals: none  Pain/Comfort:  Pain Rating 1: 5/10  Location 1:  (back)  Pain Addressed 1: Distraction, Reposition  Pain Rating Post-Intervention 1: 5/10      Objective:     Communicated with nurse prior to session.  Patient found up in chair with telemetry, peripheral IV upon PT entry to room.     General Precautions: Standard, fall  Orthopedic Precautions: spinal precautions  Braces: LSO  Respiratory Status: Room air     Functional Mobility:  Bed Mobility:     Sit to Supine: moderate assistance  Transfers:     Sit to Stand:  moderate assistance with rolling walker  Bed to Chair: moderate assistance with  rolling walker  using  Stand Pivot  Balance: fair      AM-PAC 6 CLICK MOBILITY  Turning over in bed (including adjusting bedclothes, sheets and blankets)?: 2  Sitting down on and standing up from a chair with arms (e.g.,  wheelchair, bedside commode, etc.): 2  Moving from lying on back to sitting on the side of the bed?: 2  Moving to and from a bed to a chair (including a wheelchair)?: 2  Need to walk in hospital room?: 3  Climbing 3-5 steps with a railing?: 2  Basic Mobility Total Score: 13     Patient left supine with all lines intact and call button in reach..    GOALS:   Multidisciplinary Problems       Physical Therapy Goals          Problem: Physical Therapy    Goal Priority Disciplines Outcome Goal Variances Interventions   Physical Therapy Goal     PT, PT/OT Ongoing, Progressing     Description: Goals to be met by: 23     Patient will increase functional independence with mobility by performin. Supine to sit with Stand-by Assistance  2. Sit to supine with Stand-by Assistance  3. Sit to stand transfer with Contact Guard Assistance  4. Gait  x 200 feet with Contact Guard Assistance using Rolling Walker.                          Time Tracking:     PT Received On: 23  PT Start Time: 1432     PT Stop Time: 1448  PT Total Time (min): 16 min     Billable Minutes: Therapeutic Activity 16 minutes    Treatment Type: Treatment  PT/PTA: PT     Number of PTA visits since last PT visit: 2023

## 2023-04-12 NOTE — PLAN OF CARE
Problem: Physical Therapy  Goal: Physical Therapy Goal  Description: Goals to be met by: 23     Patient will increase functional independence with mobility by performin. Supine to sit with Stand-by Assistance  2. Sit to supine with Stand-by Assistance  3. Sit to stand transfer with Contact Guard Assistance  4. Gait  x 200 feet with Contact Guard Assistance using Rolling Walker.     Outcome: Ongoing, Progressing

## 2023-04-12 NOTE — CODE 44
"    MEDICARE CONDITION 44     (Reference: Transmittal 200 of the Medicare Manual)     A Medicare "Inpatient Admission" may be changed to an "Outpatient" (includes  Outpatient Observation) status, if the following conditions exist:  The change in patient status from inpatient to outpatient is made prior to        discharge or release, while the patient is still a patient in the hospital.   The hospital has not submitted a claim for the inpatient admission.  A physician concurs with the Utilization Committee decision.   Physician concurrence with the Utilization Committee's decision is documented         in the patient's records.      The entire case has been reviewed with Dr. Thuy Lacy on 4/12/23 at 1 PM CT, attending physician and me, physician advisor/member of the Utilization Management Committee. We are both in agreement that this should be an Outpatient/Observation encounter because the patient did not meet medical necessity for inpatient admission. The patient and/or patient representative have been notified of the change in billing status.           Richard Maki MD  Utilization Management  Physician Advisor  [facility]      "

## 2023-04-13 LAB
ANION GAP SERPL CALC-SCNC: 4 MEQ/L
BUN SERPL-MCNC: 20.7 MG/DL (ref 8.4–25.7)
CALCIUM SERPL-MCNC: 8.7 MG/DL (ref 8.8–10)
CHLORIDE SERPL-SCNC: 105 MMOL/L (ref 98–107)
CO2 SERPL-SCNC: 28 MMOL/L (ref 23–31)
CREAT SERPL-MCNC: 1.26 MG/DL (ref 0.73–1.18)
CREAT/UREA NIT SERPL: 16
GFR SERPLBLD CREATININE-BSD FMLA CKD-EPI: 55 MLS/MIN/1.73/M2
GLUCOSE SERPL-MCNC: 92 MG/DL (ref 82–115)
POCT GLUCOSE: 114 MG/DL (ref 70–110)
POCT GLUCOSE: 129 MG/DL (ref 70–110)
POCT GLUCOSE: 137 MG/DL (ref 70–110)
POCT GLUCOSE: 138 MG/DL (ref 70–110)
POTASSIUM SERPL-SCNC: 4.9 MMOL/L (ref 3.5–5.1)
SODIUM SERPL-SCNC: 137 MMOL/L (ref 136–145)

## 2023-04-13 PROCEDURE — 80048 BASIC METABOLIC PNL TOTAL CA: CPT | Performed by: INTERNAL MEDICINE

## 2023-04-13 PROCEDURE — 25000003 PHARM REV CODE 250: Performed by: NURSE PRACTITIONER

## 2023-04-13 PROCEDURE — 93010 EKG 12-LEAD: ICD-10-PCS | Mod: ,,, | Performed by: INTERNAL MEDICINE

## 2023-04-13 PROCEDURE — G0378 HOSPITAL OBSERVATION PER HR: HCPCS

## 2023-04-13 PROCEDURE — 25000003 PHARM REV CODE 250: Performed by: INTERNAL MEDICINE

## 2023-04-13 PROCEDURE — 63600175 PHARM REV CODE 636 W HCPCS: Performed by: NURSE PRACTITIONER

## 2023-04-13 PROCEDURE — 93005 ELECTROCARDIOGRAM TRACING: CPT

## 2023-04-13 PROCEDURE — 97166 OT EVAL MOD COMPLEX 45 MIN: CPT

## 2023-04-13 PROCEDURE — 93010 ELECTROCARDIOGRAM REPORT: CPT | Mod: ,,, | Performed by: INTERNAL MEDICINE

## 2023-04-13 PROCEDURE — 96374 THER/PROPH/DIAG INJ IV PUSH: CPT | Mod: 59

## 2023-04-13 PROCEDURE — 97110 THERAPEUTIC EXERCISES: CPT

## 2023-04-13 PROCEDURE — 97530 THERAPEUTIC ACTIVITIES: CPT

## 2023-04-13 RX ORDER — SENNOSIDES 8.6 MG/1
8.6 TABLET ORAL DAILY
Status: DISCONTINUED | OUTPATIENT
Start: 2023-04-13 | End: 2023-04-14

## 2023-04-13 RX ORDER — POLYETHYLENE GLYCOL 3350 17 G/17G
17 POWDER, FOR SOLUTION ORAL 2 TIMES DAILY
Status: DISCONTINUED | OUTPATIENT
Start: 2023-04-13 | End: 2023-04-17 | Stop reason: HOSPADM

## 2023-04-13 RX ORDER — ACETAMINOPHEN 500 MG
1000 TABLET ORAL 3 TIMES DAILY
Status: DISCONTINUED | OUTPATIENT
Start: 2023-04-13 | End: 2023-04-16

## 2023-04-13 RX ADMIN — MUPIROCIN: 20 OINTMENT TOPICAL at 08:04

## 2023-04-13 RX ADMIN — APIXABAN 5 MG: 5 TABLET, FILM COATED ORAL at 08:04

## 2023-04-13 RX ADMIN — AMIODARONE HYDROCHLORIDE 200 MG: 200 TABLET ORAL at 09:04

## 2023-04-13 RX ADMIN — THERA TABS 1 TABLET: TAB at 09:04

## 2023-04-13 RX ADMIN — ACETAMINOPHEN 325MG 650 MG: 325 TABLET ORAL at 09:04

## 2023-04-13 RX ADMIN — TAMSULOSIN HYDROCHLORIDE 0.4 MG: 0.4 CAPSULE ORAL at 08:04

## 2023-04-13 RX ADMIN — ACETAMINOPHEN 1000 MG: 500 TABLET, FILM COATED ORAL at 11:04

## 2023-04-13 RX ADMIN — SENNOSIDES 8.6 MG: 8.6 TABLET, FILM COATED ORAL at 04:04

## 2023-04-13 RX ADMIN — ACETAMINOPHEN 325MG 650 MG: 325 TABLET ORAL at 01:04

## 2023-04-13 RX ADMIN — AMIODARONE HYDROCHLORIDE 200 MG: 200 TABLET ORAL at 08:04

## 2023-04-13 RX ADMIN — CETIRIZINE HYDROCHLORIDE 10 MG: 10 TABLET, FILM COATED ORAL at 09:04

## 2023-04-13 RX ADMIN — POLYETHYLENE GLYCOL 3350 17 G: 17 POWDER, FOR SOLUTION ORAL at 09:04

## 2023-04-13 RX ADMIN — OXYCODONE 5 MG: 5 TABLET ORAL at 09:04

## 2023-04-13 RX ADMIN — APIXABAN 5 MG: 5 TABLET, FILM COATED ORAL at 09:04

## 2023-04-13 RX ADMIN — POTASSIUM CHLORIDE 20 MEQ: 1500 TABLET, EXTENDED RELEASE ORAL at 09:04

## 2023-04-13 RX ADMIN — ACETAMINOPHEN 325MG 650 MG: 325 TABLET ORAL at 06:04

## 2023-04-13 RX ADMIN — MUPIROCIN: 20 OINTMENT TOPICAL at 09:04

## 2023-04-13 RX ADMIN — POLYETHYLENE GLYCOL 3350 17 G: 17 POWDER, FOR SOLUTION ORAL at 08:04

## 2023-04-13 RX ADMIN — HYDRALAZINE HYDROCHLORIDE 10 MG: 20 INJECTION INTRAMUSCULAR; INTRAVENOUS at 01:04

## 2023-04-13 NOTE — PROGRESS NOTES
[facility]  Hospital Medicine Progress Note           Chief Complaint   Back Pain (Pt to ED with c/o nontraumatic lower back pain since Friday. )     History of Present Illness   Mr. Mayes is a very poor historian, so information was gathered mostly from the medical record as there is no one at the bedside to assist with history.  He is an 88-year-old male with a PMH of CAD, SSS, CHF, CKD, DM 2, HLD, HTN, CVA who presented to the ED with complaints of low back pain getting progressively worse after a fall 2 weeks ago.  He is unsure if he has seen a medical professional regarding the back pain or the fall until today.  He states the pain is worse with movement.  He is unable to give any other specifics.     Today's ED lab work was at patient's baseline.  XR lumbar spine showed age indeterminate L3 compression fracture.  CT L-spine without contrast showed mild L3 superior endplate compression deformity is age indeterminate.  CT L-spine with contrast showed moderate L3 compression deformity with minimal retropulsion.  Mild L1 compression deformity with lucent fracture line seen.  Moderate spinal canal narrowing L3-L4 and L4-L5 secondary to disc and facet degenerative change.  During his ED stay he was noted to have urinary retention, holding approximately 700 mL of urine.  Pena catheter placed.  He was afebrile, VSS on RA.  Neurosurgery, Neurology, Urology were all consulted in the ED. LSO brace was placed per neurosurgery recommendation.  He was admitted to Hospital Medicine for management.     Patient admitted due to low back pain.  Reports a history of multiple falls.  Further imaging revealed L1 and L3 compression fracture.  Neurosurgery has been consulted awaiting recs.  Also with urinary retention status post indwelling Pena placed, urology also consulted - to continue indwelling pena, flomax.       Interval Hx:  Patient seen at bedside.Patient has no new complaints today .  "Neurology thinks, falls probably due to  deconditioning.Follow-up MRI brain, C-spine, L-spine with and without ordered by Neurology for further workup        Exam  General: Awake, alert, oriented, in no acute distress, non-toxic appearing. Appears comfortable, currently eating chips.  HEENT: NC/AT  Neck:  Supple  Chest: Clear bilaterally, no wheezing or rales, no accessory muscle use   CVS: Regular rhythm. Normal S1/S2.  Abdomen: nondistended, normoactive BS, soft and non-tender.  MSK: No obvious deformity or joint swelling. LSO brace in place  Skin: Warm and dry  Neuro: AAOx3, no focal neurological deficit  Psych: Cooperative        Assessment & Plan   Status post multiple falls   L1 and L3 compression fracture - age indeterminate   Spinal stenosis L3-L4 and L4-L5 region  Urinary retention status post indwelling Mendez   Mild ANMOL versus CKD      History:  CAD, SSS, CHF, CKD, DM 2, HLD, HTN, CVA     Plan:  Neurosurgery evaluated no surgical interventions necessary, recommend  to continue use of LSO brace when out of bed  Follow-up MRI brain, C-spine, L-spine with and without ordered by Neurology for further workup , will f /u Neurology recs, NSGY continue to follow and we will also discuss about MRI findings  F/u EKG,ECHO  Pain Control , Monitor  Bowel Movements  Mendez catheter in place, Urology have seen the patient. Their recs are as follows" If he is here at the end of the week, can offer voiding trial prior to discharge. If he is discharged sooner than this, he can f/u in 1-2 weeks in the office for voiding trial"  Holding  Lasix and losartan, avoid nephrotoxic, continue normal saline at 50 cc/hour and trend BMP , Monitor Sodium  Sliding scale insulin with accu-checks  Antihypertensives as needed  Resumed other home meds as appropriate  PT/OT to continue  Waiting for placement     VTE Prophylaxis: Continue home Eliquis         VITAL SIGNS: 24 HRS MIN & MAX LAST   Temp  Min: 97.3 °F (36.3 °C)  Max: 98.5 °F (36.9 " °C) 97.3 °F (36.3 °C)   BP  Min: 92/55  Max: 170/76 123/67   Pulse  Min: 61  Max: 86  68   Resp  Min: 18  Max: 18 18   SpO2  Min: 88 %  Max: 97 % (!) 93 %         Recent Labs   Lab 04/10/23  1328 04/11/23  0416   WBC 7.1 5.7   RBC 4.70 4.05*   HGB 13.7* 11.8*   HCT 41.6* 36.3*   MCV 88.5 89.6   MCH 29.1 29.1   MCHC 32.9* 32.5*   RDW 16.3 16.5    129*   MPV 10.5* 10.3       Recent Labs   Lab 04/10/23  1333 04/11/23  0416 04/12/23  0453    139 135*   K 4.1 3.7 4.5   CO2 30 29 27   BUN 20.7 20.0 22.2   CREATININE 1.48* 1.47* 1.36*   CALCIUM 9.2 8.9 8.4*   MG  --  1.90  --    ALBUMIN 3.4 2.8*  --    ALKPHOS 159* 146  --    ALT 53 44  --    AST 54* 48*  --    BILITOT 0.9 0.7  --           Microbiology Results (last 7 days)       ** No results found for the last 168 hours. **             See below for Radiology    Scheduled Med:   acetaminophen  650 mg Oral Q6H    amiodarone  200 mg Oral BID    apixaban  5 mg Oral BID    cetirizine  10 mg Oral Daily    multivitamin  1 tablet Oral Daily    mupirocin   Nasal BID    polyethylene glycol  17 g Oral Daily    potassium chloride  20 mEq Oral Daily    tamsulosin  0.4 mg Oral QHS        Continuous Infusions:   sodium chloride 0.9% 50 mL/hr at 04/12/23 0549        PRN Meds:  acetaminophen, aluminum-magnesium hydroxide-simethicone, dextrose 10%, dextrose 10%, glucagon (human recombinant), glucose, glucose, hydrALAZINE, insulin aspart U-100, labetaloL, melatonin, naloxone, ondansetron, oxyCODONE, polyethylene glycol, prochlorperazine, simethicone           Patient condition:  Stable    Anticipated discharge and Disposition:     SNF    All diagnosis and differential diagnosis have been reviewed; assessment and plan has been documented; I have personally reviewed the labs and test results that are presently available; I have reviewed the patients medication list; I have reviewed the consulting providers response and recommendations. I have reviewed or attempted to review  medical records based upon their availability    All of the patient's questions have been  addressed and answered. Patient's is agreeable to the above stated plan. I will continue to monitor closely and make adjustments to medical management as needed.  _____________________________________________________________________    Nutrition Status:    Radiology:  MRI Lumbar Spine Without Contrast  Narrative: EXAMINATION:  MRI LUMBAR SPINE WITHOUT CONTRAST    TECHNIQUE:  recurring falls, lower extremity weakness;    COMPARISON:  CT lumbar spine April 10, 2023    FINDINGS:  There is acute compression deformity of L1 vertebral body along the superior endplate which mostly involves the anterior column with mild loss of stature.  The height of the middle column is intact and there is no retropulsed bony fragment into the spinal canal.  There are anterolateral mild paraspinal soft inflammations.  No epidural inflammation or fluid collection and there is no central canal stenosis.    There is chronic compression deformity of L3 vertebral body again mostly involving the anterior column and results in 60% loss of stature and this appears to be related to a prominent Schmorl node defect.  There is again no retropulsed bony fragment into the spinal canal.  Lumbar vertebrae alignment is preserved.  The visualized thoracic cord is unremarkable. The conus medullaris terminates at L1.  Bilateral kidneys multiple hyperintense cystic structures.  Disc segmental analysis is given below:    At T12-L1 disc height is preserved.  Central canal is not stenosed and there are no narrowings of the neural foramen.    At L1-L2, there is disc bulge, ligamentum flavum thickening and facet arthropathy.  There is no significant central canal stenosis.  There are no narrowings are neural foramen.    At L2-L3, there is disc bulge which flattens the ventral thecal sac bilateral facet arthropathy.  These findings combine to cause moderate central canal  stenosis.  There are no narrowings of the neural foramen    At L3-L4, there is generalized disc bulge with central annular fissure and associated osteophyte complex which compresses the ventral thecal sac.  There is also bilateral facet arthropathy and ligamentum flavum thickening.  These findings cause moderate to marked central canal stenosis.  There are bilateral mild narrowings of the neural foramen.    At L4-L5, there is generalized disc bulge, ligamentum flavum thickening and facet arthropathy resulting in marked central canal stenosis.  Bilateral neural foramen dorsally are encroached by facets arthropathy.  There is moderate narrowing of the right neural foramen and mild narrowing of the left neural canal.    At L5-S1, disc height is preserved.  Bilateral facet arthropathy.  Central canal is not stenosed.  There is left facet small synovial cyst.  There are no significant narrowings of the neural foramen.  Impression: 1. L1 vertebral body acute compression deformity involving the anterior column without retropulsed bony fragment into the spinal canal.    2. L3 vertebral body old compression deformity.    3.  Lumbar spine degenerative disc disease and spondylosis level by level discussed above.    Electronically signed by: Eusebio Cole  Date:    04/13/2023  Time:    12:34  MRI Cervical Spine Without Contrast  Narrative: EXAMINATION:  MRI CERVICAL SPINE WITHOUT CONTRAST    CLINICAL HISTORY:  upper extremity weakness;    TECHNIQUE:  Multiple MRI pulse sequences were performed of the cervical spine without contrast.    COMPARISON:  CT cervical spine June 5, 2022    FINDINGS:  There is trace of grade 1 anterolisthesis of C7 and T1.  There is loss of normal cervical lordosis with some straightening.  Cervical vertebrae stature is preserved.  There are no acute marrow edematous signals.  Anterior degenerative hypertrophic spurrings.  No prevertebral soft tissue prominence.  There are multiple indentations upon the  thecal sac by disc pathology and spondylosis without cord edema or myelomalacia.  Disc segmental analysis is given below:    At C2-C3, there is central osteophyte disc complex which indents the ventral thecal sac without cord compression.  There is bilateral uncovertebral and facet arthropathy which is more pronounced on the left.  There is no significant narrowing the right neural foramen.  There is moderate spondylotic narrowing of the left neural canal.    At C3-C4, there is disc bulge which indents the ventral thecal sac without cord compression.  This level is also remarkable for uncovertebral and facet arthropathy.  These findings result in bilateral moderate narrowings of the neural foramen.    At C4-C5, there is disc bulge which indents ventral subarachnoid space without causing cord compression.  Marked narrowing of the right neural foramen is caused by uncovertebral and facet arthropathy.  There is also moderate spondylotic narrowing of the left neural foramen.    At C5-C6, there is osteophyte disc complex which indents the ventral thecal sac.  Cord is not compressed.  There is moderate narrowing of the right neural foramen and marked narrowing of the left neural foramen caused by uncovertebral and facet arthropathy.    At C6-C7, there is osteophyte disc complex which indents the ventral thecal sac and is more pronounced in the right paracentral location.  Cord is not compressed.  Bilateral neural foramen are encroached by uncovertebral and to a lesser degree facet arthropathy.  These findings are more pronounced on the left.  This results in moderate narrowing of the right neural foramen and severe narrowing of the left neural canal.    At C7-T1, disc height is preserved.  Central canal is not stenosed.  There are no narrowings of the neural foramen  Impression: Cervical spine degenerative disc disease and spondylosis level by level discussed above.    Electronically signed by: Eusebio  Kelsey  Date:    04/13/2023  Time:    12:20  MRI Brain Without Contrast  Narrative: EXAMINATION:  MRI BRAIN WITHOUT CONTRAST    CLINICAL HISTORY:  Mental status change, unknown cause;    TECHNIQUE:  Multiplanar MRI sequences were performed of the brain without contrast.    COMPARISON:  CT brain without contrast September 28, 2022    FINDINGS:  There are old infarcts which involve the right cerebellum, migel, right basal ganglia and the left corona radiata.  Chronic microvascular ischemic changes are mild with periventricular and deep white matter T2 FLAIR hyperintense signals.  There is generalized cerebral and cerebellar cortical volume loss.  Right basal ganglia old infarct shows gradient echo sequence dephasing artifact related to old hemorrhagic byproducts.  No evidence of diffusion restriction or ADC map signal drop out to suggest acute infarct. The sella and suprasellar areas are unremarkable.    The cerebellar tonsils are normally positioned. There is no acute intracranial hemorrhage, hydrocephalus, midline shift or mass effect. No acute extra axial fluid collections identified.  There are bilateral mastoid air cells hyperintense inflammatory signals.  Left maxillary sinus retention cyst.  Impression: 1.  No acute intracranial findings identified.    2.  Multiple old infarcts, chronic microangiopathic ischemia and atrophy.    Electronically signed by: Eusebio Cole  Date:    04/13/2023  Time:    12:00      Argelia Narvaez MD   04/13/2023

## 2023-04-13 NOTE — PLAN OF CARE
Problem: Occupational Therapy  Goal: Occupational Therapy Goal  Description: Goals to be met by: 5/11/23     Patient will increase functional independence with ADLs by performing:    UE Dressing with Modified [place]. LSO  LE Dressing with Modified [place].  Grooming while standing with Modified [place].  Toileting from toilet with Modified [place] for hygiene and clothing management.   Toilet transfer to toilet with Modified [place].    Outcome: Ongoing, Progressing

## 2023-04-13 NOTE — PT/OT/SLP PROGRESS
Physical Therapy Treatment    Patient Name:  Philip Mayes   MRN:  [id number]    Recommendations:     Discharge Recommendations: nursing facility, skilled  Discharge Equipment Recommendations: none  Barriers to discharge: None    Assessment:     Philip Mayes is a 88 y.o. male admitted with a medical diagnosis of <principal problem not specified>.  He presents with the following impairments/functional limitations: weakness, impaired endurance, impaired self care skills, impaired functional mobility, gait instability, impaired balance, decreased safety awareness, pain.    Rehab Prognosis: Good; patient would benefit from acute skilled PT services to address these deficits and reach maximum level of function.    Recent Surgery: * No surgery found *      Plan:     During this hospitalization, patient to be seen 5 x/week to address the identified rehab impairments via gait training, therapeutic activities, therapeutic exercises, neuromuscular re-education and progress toward the following goals:    Plan of Care Expires:  05/12/23    Subjective     Chief Complaint: pain  Patient/Family Comments/goals: none  Pain/Comfort:  Pain Rating 1: 6/10  Location 1:  (back)  Pain Addressed 1: Distraction, Reposition  Pain Rating Post-Intervention 1: 6/10      Objective:     Communicated with nurse prior to session.  Patient found supine with telemetry, peripheral IV upon PT entry to room.     General Precautions: Standard, fall  Orthopedic Precautions: spinal precautions  Braces: LSO  Respiratory Status: Room air     Functional Mobility:  Bed Mobility:     Supine to Sit: minimum assistance  Transfers:     Sit to Stand:  minimum assistance with rolling walker  Gait: 50 ft with RW & CGA  Balance: fair      AM-PAC 6 CLICK MOBILITY  Turning over in bed (including adjusting bedclothes, sheets and blankets)?: 3  Sitting down on and standing up from a chair with arms (e.g., wheelchair, bedside commode, etc.): 3  Moving from lying on  back to sitting on the side of the bed?: 3  Moving to and from a bed to a chair (including a wheelchair)?: 3  Need to walk in hospital room?: 3  Climbing 3-5 steps with a railing?: 2  Basic Mobility Total Score: 17     Therex:   -Patient performed seated Marches, Heel Raises, LAQ, Glute Sets, Resisted Hip ABD/ADD. All performed 2 x 20 reps.    Patient left up in chair with all lines intact, call button in reach, chair alarm on, and son present..    GOALS:   Multidisciplinary Problems       Physical Therapy Goals          Problem: Physical Therapy    Goal Priority Disciplines Outcome Goal Variances Interventions   Physical Therapy Goal     PT, PT/OT Ongoing, Progressing     Description: Goals to be met by: 23     Patient will increase functional independence with mobility by performin. Supine to sit with Stand-by Assistance  2. Sit to supine with Stand-by Assistance  3. Sit to stand transfer with Contact Guard Assistance  4. Gait  x 200 feet with Contact Guard Assistance using Rolling Walker.                          Time Tracking:     PT Received On: 23  PT Start Time: 0730     PT Stop Time: 0755  PT Total Time (min): 25 min     Billable Minutes: Therapeutic Activity 15 minutes and Therapeutic Exercise 10 minutes    Treatment Type: Treatment  PT/PTA: PT     Number of PTA visits since last PT visit: 2     2023

## 2023-04-13 NOTE — PT/OT/SLP EVAL
"Occupational Therapy  Evaluation    Name: Philip Mayes  MRN: [id number]  Admitting Diagnosis: <principal problem not specified>  Recent Surgery: * No surgery found *      Recommendations:     Discharge Recommendations: nursing facility, skilled  Discharge Equipment Recommendations:  walker, rolling  Barriers to discharge:  None    Assessment:     Philip Mayes is a 88 y.o. male with a medical diagnosis of falls with L1-3 compression fx's.  He presents with good effort and motivated to get stronger. Performance deficits affecting function: weakness, impaired endurance, impaired self care skills, impaired functional mobility.      Rehab Prognosis: good; patient would benefit from acute skilled OT services to address these deficits and reach maximum level of function.       Plan:     Patient to be seen 3 x/week, 5 x/week to address the above listed problems via self-care/home management, therapeutic activities, therapeutic exercises  Plan of Care Expires: 05/11/23  Plan of Care Reviewed with: patient    Subjective     Chief Complaint: none stated  Patient/Family Comments/goals: "look, I couldn't do this before" (lifting his legs off of the bed)    Occupational Profile:  Living Environment: lives in SS home with wife, stairs to enter, lives with spouse  Previous level of function: was able to ambulate with walker in the home and dress/bathe independently but has been weaker over last weeks  Roles and Routines:   Equipment Used at Home: shower chair, walker, standard  Assistance upon Discharge: wife    Pain/Comfort:  Pain Rating 1: 0/10    Patients cultural, spiritual, [employer] conflicts given the current situation:      Objective:     Communicated with: nsg prior to session.  Patient found HOB elevated with telemetry, peripheral IV upon OT entry to room.    General Precautions: Standard, fall  Orthopedic Precautions: spinal precautions  Braces: LSO  Respiratory Status: Nasal cannula, flow 1 L/min  Vital " Signs:     Occupational Performance:    Bed Mobility:    Sup to sit with min assist  Sit to sup with min assist    Functional Mobility/Transfers:  Sit to stand with min assist with RW  Functional Mobility: ambulated in room x 16 ft with min assist/CGA    Activities of Daily Living:  LSO donned with mod assist   Socks max assist    Cognitive/Visual Perceptual:  Follows commands appropriately, [employer] [place]    Physical Exam:  WFL AROM BUE's    Therapeutic Positioning  Skin integrity: Visible skin intact    Risk for acquired pressure injuries is increased due to decreased mobility while in the hospital, age, co-morbidities.    The following interventions were performed in an effort to prevent and/or reduce acquired pressure ulcers: skin assessment was performed, education was provided on pressure ulcer prevention , therapeutic positioning was provided to offload all bony prominences at the conclusion of session, and pt with PUP kit in room    OT recommendations for therapeutic positioning throughout hospitalization: turning at least every 2 hours with use of wedge to offload sacrum while on pt side, use of heel floaters to offload heels, avoid use of briefs in bed, and limit of 3 layers under patient at all times     AMPA 6 Click ADL:  AMPAC Total Score:      Additional Treatment:       Patient Education:  Patient provided with verbal education regarding POC, safety with mobility, rec's.  Understanding was verbalized.     Patient left left sidelying with all lines intact and call button in reach, wedge in place.     GOALS:   Multidisciplinary Problems       Occupational Therapy Goals          Problem: Occupational Therapy    Goal Priority Disciplines Outcome Interventions   Occupational Therapy Goal     OT, PT/OT Ongoing, Progressing    Description: Goals to be met by: 5/11/23     Patient will increase functional independence with ADLs by performing:    UE Dressing with Modified [place]. LSO  LE Dressing with Modified  [place].  Grooming while standing with Modified [place].  Toileting from toilet with Modified [place] for hygiene and clothing management.   Toilet transfer to toilet with Modified [place].                         History:     Past Medical History:   Diagnosis Date    Diabetes mellitus     Hypercholesterolemia     Hypertension     Stroke          Past Surgical History:   Procedure Laterality Date    INSERTION OF PACEMAKER      KNEE SURGERY Right        Time Tracking:     OT Date of Treatment: 04/13/23  OT Start Time: 1515  OT Stop Time: 1533  OT Total Time (min): 18 min    Billable Minutes:Evaluation mod complexity 18 min    4/13/2023

## 2023-04-13 NOTE — PLAN OF CARE
Attempted to reach patient's wife for assessment and discuss placement via phone. No answer and no voicemail box set up. Will continue to follow-up

## 2023-04-13 NOTE — PLAN OF CARE
04/13/23 1432   Final Note   Assessment Type Final Discharge Note   Anticipated Discharge Disposition SNF   Post-Acute Status   Post-Acute Authorization Placement   Post-Acute Placement Status Referrals Sent  (Verbal [facility])

## 2023-04-13 NOTE — PLAN OF CARE
04/13/23 1421   Discharge Assessment   Assessment Type Discharge Planning Assessment   Confirmed/corrected address, phone number and insurance Yes   Confirmed Demographics Correct on Facesheet   Source of Information patient;family   People in Home spouse   Do you expect to return to your current living situation? Yes   Do you have help at home or someone to help you manage your care at home? Yes   Prior to hospitilization cognitive status: Alert/Oriented   Current cognitive status: Alert/Oriented   Home Accessibility stairs to enter home   Number of Stairs, Main Entrance one   Home Layout Able to live on 1st floor   Equipment Currently Used at Home shower chair;walker, standard   Readmission within 30 days? No   Patient currently being followed by outpatient case management? No   Do you currently have service(s) that help you manage your care at home? No   Do you take prescription medications? Yes   Do you have prescription coverage? Yes   Do you have any problems affording any of your prescribed medications? No   Is the patient taking medications as prescribed? yes   How do you get to doctors appointments? family or friend will provide   Are you on dialysis? No   Do you take coumadin? No   Discharge Plan A Skilled Nursing Facility   Discharge Plan B Skilled Nursing Facility   DME Needed Upon Discharge  none   Discharge Plan discussed with: Patient;Spouse/sig other   Discharge Barriers Identified None

## 2023-04-14 LAB
ANION GAP SERPL CALC-SCNC: 7 MEQ/L
AV INDEX (PROSTH): 0.47
AV MEAN GRADIENT: 8 MMHG
AV PEAK GRADIENT: 14 MMHG
AV VALVE AREA: 1.47 CM2
AV VELOCITY RATIO: 0.48
BSA FOR ECHO PROCEDURE: 1.98 M2
BUN SERPL-MCNC: 19.6 MG/DL (ref 8.4–25.7)
CALCIUM SERPL-MCNC: 8.3 MG/DL (ref 8.8–10)
CHLORIDE SERPL-SCNC: 107 MMOL/L (ref 98–107)
CO2 SERPL-SCNC: 26 MMOL/L (ref 23–31)
CREAT SERPL-MCNC: 1.31 MG/DL (ref 0.73–1.18)
CREAT/UREA NIT SERPL: 15
CV ECHO LV RWT: 0.57 CM
DOP CALC AO PEAK VEL: 1.9 M/S
DOP CALC AO VTI: 42.2 CM
DOP CALC LVOT AREA: 3.1 CM2
DOP CALC LVOT DIAMETER: 2 CM
DOP CALC LVOT PEAK VEL: 0.91 M/S
DOP CALC LVOT STROKE VOLUME: 61.86 CM3
DOP CALC MV VTI: 28.4 CM
DOP CALCLVOT PEAK VEL VTI: 19.7 CM
E WAVE DECELERATION TIME: 226 MSEC
E/A RATIO: 0.64
E/E' RATIO: 15.11 M/S
ECHO LV POSTERIOR WALL: 1.33 CM (ref 0.6–1.1)
EJECTION FRACTION: 35 %
FRACTIONAL SHORTENING: 24 % (ref 28–44)
GFR SERPLBLD CREATININE-BSD FMLA CKD-EPI: 52 MLS/MIN/1.73/M2
GLUCOSE SERPL-MCNC: 103 MG/DL (ref 82–115)
INTERVENTRICULAR SEPTUM: 1.34 CM (ref 0.6–1.1)
LEFT ATRIUM SIZE: 4.3 CM
LEFT INTERNAL DIMENSION IN SYSTOLE: 3.55 CM (ref 2.1–4)
LEFT VENTRICLE DIASTOLIC VOLUME INDEX: 51.27 ML/M2
LEFT VENTRICLE DIASTOLIC VOLUME: 101 ML
LEFT VENTRICLE MASS INDEX: 124 G/M2
LEFT VENTRICLE SYSTOLIC VOLUME INDEX: 26.7 ML/M2
LEFT VENTRICLE SYSTOLIC VOLUME: 52.6 ML
LEFT VENTRICULAR INTERNAL DIMENSION IN DIASTOLE: 4.67 CM (ref 3.5–6)
LEFT VENTRICULAR MASS: 244.87 G
LV LATERAL E/E' RATIO: 17 M/S
LV SEPTAL E/E' RATIO: 13.6 M/S
LVOT MG: 2 MMHG
LVOT MV: 0.61 CM/S
MV MEAN GRADIENT: 2 MMHG
MV PEAK A VEL: 1.06 M/S
MV PEAK E VEL: 0.68 M/S
MV PEAK GRADIENT: 4 MMHG
MV STENOSIS PRESSURE HALF TIME: 40 MS
MV VALVE AREA BY CONTINUITY EQUATION: 2.18 CM2
MV VALVE AREA P 1/2 METHOD: 5.5 CM2
PISA TR MAX VEL: 1.69 M/S
POCT GLUCOSE: 108 MG/DL (ref 70–110)
POCT GLUCOSE: 112 MG/DL (ref 70–110)
POCT GLUCOSE: 124 MG/DL (ref 70–110)
POCT GLUCOSE: 174 MG/DL (ref 70–110)
POTASSIUM SERPL-SCNC: 4.4 MMOL/L (ref 3.5–5.1)
PV PEAK VELOCITY: 1.06 CM/S
RA PRESSURE: 3 MMHG
SODIUM SERPL-SCNC: 140 MMOL/L (ref 136–145)
TDI LATERAL: 0.04 M/S
TDI SEPTAL: 0.05 M/S
TDI: 0.05 M/S
TR MAX PG: 11 MMHG
TRICUSPID ANNULAR PLANE SYSTOLIC EXCURSION: 1.69 CM
TV REST PULMONARY ARTERY PRESSURE: 14 MMHG

## 2023-04-14 PROCEDURE — 25000003 PHARM REV CODE 250: Performed by: INTERNAL MEDICINE

## 2023-04-14 PROCEDURE — 96372 THER/PROPH/DIAG INJ SC/IM: CPT | Performed by: NURSE PRACTITIONER

## 2023-04-14 PROCEDURE — G0378 HOSPITAL OBSERVATION PER HR: HCPCS

## 2023-04-14 PROCEDURE — 63600175 PHARM REV CODE 636 W HCPCS: Performed by: NURSE PRACTITIONER

## 2023-04-14 PROCEDURE — 25000003 PHARM REV CODE 250: Performed by: NURSE PRACTITIONER

## 2023-04-14 PROCEDURE — 97530 THERAPEUTIC ACTIVITIES: CPT

## 2023-04-14 PROCEDURE — 96372 THER/PROPH/DIAG INJ SC/IM: CPT | Performed by: INTERNAL MEDICINE

## 2023-04-14 PROCEDURE — 63600175 PHARM REV CODE 636 W HCPCS: Performed by: INTERNAL MEDICINE

## 2023-04-14 PROCEDURE — 97535 SELF CARE MNGMENT TRAINING: CPT | Mod: CO

## 2023-04-14 PROCEDURE — 80048 BASIC METABOLIC PNL TOTAL CA: CPT | Performed by: INTERNAL MEDICINE

## 2023-04-14 PROCEDURE — 96376 TX/PRO/DX INJ SAME DRUG ADON: CPT

## 2023-04-14 RX ORDER — SENNOSIDES 8.6 MG/1
8.6 TABLET ORAL 2 TIMES DAILY
Status: DISCONTINUED | OUTPATIENT
Start: 2023-04-14 | End: 2023-04-17 | Stop reason: HOSPADM

## 2023-04-14 RX ADMIN — AMIODARONE HYDROCHLORIDE 200 MG: 200 TABLET ORAL at 09:04

## 2023-04-14 RX ADMIN — METHYLNALTREXONE BROMIDE 6 MG: 12 INJECTION, SOLUTION SUBCUTANEOUS at 03:04

## 2023-04-14 RX ADMIN — AMIODARONE HYDROCHLORIDE 200 MG: 200 TABLET ORAL at 10:04

## 2023-04-14 RX ADMIN — MUPIROCIN: 20 OINTMENT TOPICAL at 09:04

## 2023-04-14 RX ADMIN — POLYETHYLENE GLYCOL 3350 17 G: 17 POWDER, FOR SOLUTION ORAL at 09:04

## 2023-04-14 RX ADMIN — OXYCODONE 5 MG: 5 TABLET ORAL at 05:04

## 2023-04-14 RX ADMIN — HYDRALAZINE HYDROCHLORIDE 10 MG: 20 INJECTION INTRAMUSCULAR; INTRAVENOUS at 03:04

## 2023-04-14 RX ADMIN — THERA TABS 1 TABLET: TAB at 09:04

## 2023-04-14 RX ADMIN — SENNOSIDES 8.6 MG: 8.6 TABLET, FILM COATED ORAL at 09:04

## 2023-04-14 RX ADMIN — OXYCODONE 5 MG: 5 TABLET ORAL at 03:04

## 2023-04-14 RX ADMIN — CETIRIZINE HYDROCHLORIDE 10 MG: 10 TABLET, FILM COATED ORAL at 09:04

## 2023-04-14 RX ADMIN — Medication 6 MG: at 09:04

## 2023-04-14 RX ADMIN — ACETAMINOPHEN 1000 MG: 500 TABLET, FILM COATED ORAL at 03:04

## 2023-04-14 RX ADMIN — INSULIN ASPART 2 UNITS: 100 INJECTION, SOLUTION INTRAVENOUS; SUBCUTANEOUS at 12:04

## 2023-04-14 RX ADMIN — TAMSULOSIN HYDROCHLORIDE 0.4 MG: 0.4 CAPSULE ORAL at 09:04

## 2023-04-14 RX ADMIN — APIXABAN 5 MG: 5 TABLET, FILM COATED ORAL at 09:04

## 2023-04-14 RX ADMIN — OXYCODONE 5 MG: 5 TABLET ORAL at 09:04

## 2023-04-14 RX ADMIN — POTASSIUM CHLORIDE 20 MEQ: 1500 TABLET, EXTENDED RELEASE ORAL at 09:04

## 2023-04-14 RX ADMIN — ACETAMINOPHEN 1000 MG: 500 TABLET, FILM COATED ORAL at 05:04

## 2023-04-14 NOTE — PT/OT/SLP PROGRESS
Occupational Therapy   Treatment    Name: Philip Mayes  MRN: [id number]  Admitting Diagnosis:  Frequent falls        Recommendations:     Discharge Recommendations: nursing facility, skilled  Discharge Equipment Recommendations:  walker, rolling  Barriers to discharge:       Assessment:     Philip Mayes is a 88 y.o. male with a medical diagnosis of frequent falls.  He presents with good motivation for participation. Performance deficits affecting function are weakness, impaired functional mobility, decreased safety awareness, impaired endurance, gait instability, impaired balance, impaired self care skills.     Rehab Prognosis:  Good; patient would benefit from acute skilled OT services to address these deficits and reach maximum level of function.       Plan:     Patient to be seen 3 x/week, 5 x/week to address the above listed problems via self-care/home management, therapeutic activities, therapeutic exercises  Plan of Care Expires: 05/11/23  Plan of Care Reviewed with: patient, family    Subjective     Pain/Comfort:  No c/o pain     Objective:     Communicated with: RN prior to session.  Patient found up in chair with telemetry, pena catheter upon OT entry to room.    General Precautions: Standard, fall    Orthopedic Precautions:spinal precautions  Braces: LSO  Respiratory Status: Room air     Occupational Performance:     Bed Mobility:    Patient completed Scooting/Bridging with minimum assistance  Patient completed Sit to Supine with minimum assistance     Functional Mobility/Transfers:  Patient completed Sit <> Stand Transfer with minimum assistance  with  rolling walker   Functional Mobility: Stand step t/f from bathroom>bed Min A with RW and cues for safety.     Activities of Daily Living:  Grooming: pt completed hand hygiene standing at sink with Min A for standing balance.   Toileting: functional mobility to bathroom with Min A and RW. Demo'd forward flexed posture requiring cues to achieve upright  posture. Performed toilet t/f with Min A for safe descent/stand from low surface. Did not feel urge to void.   LE dressing: Instructed in use of AE to don/doff socks. Completed with SBA and verbal cues for technique. Good carryover noted.  UE dressing: Min A to don/doff LSO brace      Therapeutic Positioning  Skin integrity: Visible skin intact     The following interventions were performed in an effort to prevent and/or reduce acquired pressure ulcers: all visible skin was assessed during the course of treatment      [facility] 6 Click ADL: 20    Patient Education:  Patient and family provided with verbal education regarding safety awareness and spinal pxns.  Understanding was verbalized.      Patient left supine with all lines intact, call button in reach, and family present    GOALS:   Multidisciplinary Problems       Occupational Therapy Goals          Problem: Occupational Therapy    Goal Priority Disciplines Outcome Interventions   Occupational Therapy Goal     OT, PT/OT Ongoing, Progressing    Description: Goals to be met by: 5/11/23     Patient will increase functional independence with ADLs by performing:    UE Dressing with Modified [place]. LSO  LE Dressing with Modified [place].  Grooming while standing with Modified [place].  Toileting from toilet with Modified [place] for hygiene and clothing management.   Toilet transfer to toilet with Modified [place].                         Time Tracking:     OT Date of Treatment: 04/14/23  OT Start Time: 0942  OT Stop Time: 1020  OT Total Time (min): 38 min    Billable Minutes:Self Care/Home Management 38    OT/ZACHARY: ZACHARY     Number of ZACHARY visits since last OT visit: 1 4/14/2023

## 2023-04-14 NOTE — PROGRESS NOTES
[facility] - 9th Floor Med Surg  Neurosurgery  Progress Note    Subjective:     Interval History: No acute events overnight. Patient sitting upright in bedside chair. Has been working with therapy, ambulating with a walker. Patient states his lumbar pain has been improving but is still present. He denies any lower extremity weakness or numbness.     History of Present Illness: This is an 88-year-old  male with past medical history significant for CAD, sick sinus syndrome, CHF, CKD, diabetes type 2, hyperlipidemia, hypertension, CVA who presented to the ED with lower back pain that is becoming progressively worse over about the past week.  Patient is an extremely poor historian and confused.  Wife is at the bedside and also seems a bit confused at times.  She does provide some of the history.  Labs and imaging were performed.  An x-ray of the lumbar spine demonstrated an age indeterminate L3 compression fracture.  CT of the lumbar spine without contrast demonstrated mild L3 superior endplate compression deformity age indeterminate.  During his visit in the emergency department he was noted to have urinary retention and had approximately 700 mL of urine in bladder.  Mendez was placed.  Urology was consulted.  An LSO brace was ordered.  Patient admitted to hospital medicine.  Neurosurgery was consulted.     Urology started Flomax and recommended continuing the Mendez.  They will offer a voiding trial prior to being discharged.  Retention secondary to pain and decreased mobility.    Post-Op Info:  * No surgery found *          Medications:  Continuous Infusions:   sodium chloride 0.9% 50 mL/hr at 04/12/23 0514     Scheduled Meds:   acetaminophen  1,000 mg Oral TID    amiodarone  200 mg Oral BID    apixaban  5 mg Oral BID    cetirizine  10 mg Oral Daily    multivitamin  1 tablet Oral Daily    mupirocin   Nasal BID    polyethylene glycol  17 g Oral BID    potassium chloride  20 mEq Oral Daily     "senna  8.6 mg Oral Daily    tamsulosin  0.4 mg Oral QHS     PRN Meds:acetaminophen, aluminum-magnesium hydroxide-simethicone, dextrose 10%, dextrose 10%, glucagon (human recombinant), glucose, glucose, hydrALAZINE, insulin aspart U-100, labetaloL, melatonin, naloxone, ondansetron, oxyCODONE, polyethylene glycol, prochlorperazine, simethicone       Objective:     Weight: 79.4 kg (175 lb)  Body mass index is 25.11 kg/m².  Vital Signs (Most Recent):  Temp: 97.6 °F (36.4 °C) (04/14/23 0514)  Pulse: 62 (04/14/23 0514)  Resp: 20 (04/14/23 0507)  BP: (!) 154/66 (04/14/23 0514)  SpO2: (!) 94 % (04/14/23 0514)   Vital Signs (24h Range):  Temp:  [97.3 °F (36.3 °C)-98 °F (36.7 °C)] 97.6 °F (36.4 °C)  Pulse:  [62-86] 62  Resp:  [18-20] 20  SpO2:  [88 %-98 %] 94 %  BP: (123-168)/(66-82) 154/66                              Urethral Catheter 04/10/23 1941 Silicone 16 Fr. (Active)   Site Assessment Clean;Intact;Dry 04/13/23 2200   Collection Container Standard drainage bag 04/13/23 2200   Securement Method secured to top of thigh w/ adhesive device 04/13/23 2200   Catheter Care Performed yes 04/13/23 2200   Reason for Continuing Urinary Catheterization Urinary retention 04/13/23 2200   CAUTI Prevention Bundle Securement Device in place with 1" slack 04/13/23 2200   Output (mL) 950 mL 04/11/23 0200       Neurosurgery Physical Exam  NAD. Awake,alert  EOMI.   Motors 4/5 throughout in upper and lower extremities.   Sensation intact.       Significant Labs:  Recent Labs   Lab 04/13/23  1452 04/14/23  0407    140   K 4.9 4.4   CO2 28 26   BUN 20.7 19.6   CREATININE 1.26* 1.31*   CALCIUM 8.7* 8.3*     No results for input(s): WBC, HGB, HCT, PLT in the last 48 hours.  No results for input(s): LABPT, INR, APTT in the last 48 hours.  Microbiology Results (last 7 days)       ** No results found for the last 168 hours. **            Significant Diagnostics:  MRI Brain Without Contrast  Order: [id number]  Status: Final result     Visible " to patient: No (inaccessible in Patient Portal)     Next appt: None     0 Result Notes  Details    Reading Physician Reading Date Result Priority   Eusebio Cole MD  [phone] 4/13/2023 Routine     Narrative & Impression  EXAMINATION:  MRI BRAIN WITHOUT CONTRAST     CLINICAL HISTORY:  Mental status change, unknown cause;     TECHNIQUE:  Multiplanar MRI sequences were performed of the brain without contrast.     COMPARISON:  CT brain without contrast September 28, 2022     FINDINGS:  There are old infarcts which involve the right cerebellum, migel, right basal ganglia and the left corona radiata.  Chronic microvascular ischemic changes are mild with periventricular and deep white matter T2 FLAIR hyperintense signals.  There is generalized cerebral and cerebellar cortical volume loss.  Right basal ganglia old infarct shows gradient echo sequence dephasing artifact related to old hemorrhagic byproducts.  No evidence of diffusion restriction or ADC map signal drop out to suggest acute infarct. The sella and suprasellar areas are unremarkable.     The cerebellar tonsils are normally positioned. There is no acute intracranial hemorrhage, hydrocephalus, midline shift or mass effect. No acute extra axial fluid collections identified.  There are bilateral mastoid air cells hyperintense inflammatory signals.  Left maxillary sinus retention cyst.     Impression:     1.  No acute intracranial findings identified.     2.  Multiple old infarcts, chronic microangiopathic ischemia and atrophy.        Electronically signed by: Eusebio Cole  Date:                                            04/13/2023  Time:                                           12:00           Exam Ended: 04/13/23 11:34 Last Resulted: 04/13/23 12:00           MRI Cervical Spine Without Contrast  Order: [id number]  Status: Final result     Visible to patient: No (inaccessible in Patient Portal)     Next appt: None     0 Result Notes  Details    Reading  Physician Reading Date Result Priority   Eusebio Cole MD  [phone] 4/13/2023 Routine     Narrative & Impression  EXAMINATION:  MRI CERVICAL SPINE WITHOUT CONTRAST     CLINICAL HISTORY:  upper extremity weakness;     TECHNIQUE:  Multiple MRI pulse sequences were performed of the cervical spine without contrast.     COMPARISON:  CT cervical spine June 5, 2022     FINDINGS:  There is trace of grade 1 anterolisthesis of C7 and T1.  There is loss of normal cervical lordosis with some straightening.  Cervical vertebrae stature is preserved.  There are no acute marrow edematous signals.  Anterior degenerative hypertrophic spurrings.  No prevertebral soft tissue prominence.  There are multiple indentations upon the thecal sac by disc pathology and spondylosis without cord edema or myelomalacia.  Disc segmental analysis is given below:     At C2-C3, there is central osteophyte disc complex which indents the ventral thecal sac without cord compression.  There is bilateral uncovertebral and facet arthropathy which is more pronounced on the left.  There is no significant narrowing the right neural foramen.  There is moderate spondylotic narrowing of the left neural canal.     At C3-C4, there is disc bulge which indents the ventral thecal sac without cord compression.  This level is also remarkable for uncovertebral and facet arthropathy.  These findings result in bilateral moderate narrowings of the neural foramen.     At C4-C5, there is disc bulge which indents ventral subarachnoid space without causing cord compression.  Marked narrowing of the right neural foramen is caused by uncovertebral and facet arthropathy.  There is also moderate spondylotic narrowing of the left neural foramen.     At C5-C6, there is osteophyte disc complex which indents the ventral thecal sac.  Cord is not compressed.  There is moderate narrowing of the right neural foramen and marked narrowing of the left neural foramen caused by  uncovertebral and facet arthropathy.     At C6-C7, there is osteophyte disc complex which indents the ventral thecal sac and is more pronounced in the right paracentral location.  Cord is not compressed.  Bilateral neural foramen are encroached by uncovertebral and to a lesser degree facet arthropathy.  These findings are more pronounced on the left.  This results in moderate narrowing of the right neural foramen and severe narrowing of the left neural canal.     At C7-T1, disc height is preserved.  Central canal is not stenosed.  There are no narrowings of the neural foramen     Impression:     Cervical spine degenerative disc disease and spondylosis level by level discussed above.        Electronically signed by: Eusebio Cole  Date:                                            04/13/2023  Time:                                           12:20           Exam Ended: 04/13/23 11:33 Last Resulted: 04/13/23 12:20         MRI Lumbar Spine Without Contrast  Order: [id number]  Status: Final result     Visible to patient: No (inaccessible in Patient Portal)     Next appt: None     0 Result Notes  Details    Reading Physician Reading Date Result Priority   Eusebio Cole MD  [phone] 4/13/2023 Routine     Narrative & Impression  EXAMINATION:  MRI LUMBAR SPINE WITHOUT CONTRAST     TECHNIQUE:  recurring falls, lower extremity weakness;     COMPARISON:  CT lumbar spine April 10, 2023     FINDINGS:  There is acute compression deformity of L1 vertebral body along the superior endplate which mostly involves the anterior column with mild loss of stature.  The height of the middle column is intact and there is no retropulsed bony fragment into the spinal canal.  There are anterolateral mild paraspinal soft inflammations.  No epidural inflammation or fluid collection and there is no central canal stenosis.     There is chronic compression deformity of L3 vertebral body again mostly involving the anterior column and results in 60%  loss of stature and this appears to be related to a prominent Schmorl node defect.  There is again no retropulsed bony fragment into the spinal canal.  Lumbar vertebrae alignment is preserved.  The visualized thoracic cord is unremarkable. The conus medullaris terminates at L1.  Bilateral kidneys multiple hyperintense cystic structures.  Disc segmental analysis is given below:     At T12-L1 disc height is preserved.  Central canal is not stenosed and there are no narrowings of the neural foramen.     At L1-L2, there is disc bulge, ligamentum flavum thickening and facet arthropathy.  There is no significant central canal stenosis.  There are no narrowings are neural foramen.     At L2-L3, there is disc bulge which flattens the ventral thecal sac bilateral facet arthropathy.  These findings combine to cause moderate central canal stenosis.  There are no narrowings of the neural foramen     At L3-L4, there is generalized disc bulge with central annular fissure and associated osteophyte complex which compresses the ventral thecal sac.  There is also bilateral facet arthropathy and ligamentum flavum thickening.  These findings cause moderate to marked central canal stenosis.  There are bilateral mild narrowings of the neural foramen.     At L4-L5, there is generalized disc bulge, ligamentum flavum thickening and facet arthropathy resulting in marked central canal stenosis.  Bilateral neural foramen dorsally are encroached by facets arthropathy.  There is moderate narrowing of the right neural foramen and mild narrowing of the left neural canal.     At L5-S1, disc height is preserved.  Bilateral facet arthropathy.  Central canal is not stenosed.  There is left facet small synovial cyst.  There are no significant narrowings of the neural foramen.     Impression:     1. L1 vertebral body acute compression deformity involving the anterior column without retropulsed bony fragment into the spinal canal.     2. L3 vertebral  body old compression deformity.     3.  Lumbar spine degenerative disc disease and spondylosis level by level discussed above.        Electronically signed by: Eusebio Cole  Date:                                            04/13/2023  Time:                                           12:34           Exam Ended: 04/13/23 11:31 Last Resulted: 04/13/23 12:34               Assessment/Plan:     Active Diagnoses:    Diagnosis Date Noted POA    Falls frequently [R29.6] 04/11/2023 Not Applicable      Problems Resolved During this Admission:     -Discussed MRI findings with patient and patient's son.   -MRI cervical shows chronic degenerative changes with no significant cord stenosis.   -MRI lumbar shows acute L1 and chronic L3 compression fx and L3/4 and L4/5 moderate stenosis.   -Patient reports improving lumbar pain and no symptoms to lower extremities. Urinary retention and constipation ongoing    -Pain control  -PTOT   LSO brace  -Fall precautions  -No acute neurosurgical interventions at this time  -Discussed to continue PT with LSO brace and to monitor if pain improves. If pain and symptoms continues, patient could benefit from kyphoplasty.       JAI Munguia  Neurosurgery  [facility] - 9th Floor Med Surg

## 2023-04-14 NOTE — PLAN OF CARE
ALEXANDRE completed. Explained to patient vamshi Garcia via phone.  Emailed Stacie Wilkinson for ALEXANDRE delivery.

## 2023-04-14 NOTE — PT/OT/SLP PROGRESS
Physical Therapy Treatment    Patient Name:  Philip Mayes   MRN:  [id number]    Recommendations:     Discharge Recommendations: nursing facility, skilled  Discharge Equipment Recommendations: none  Barriers to discharge: None    Assessment:     Philip Mayes is a 88 y.o. male admitted with a medical diagnosis of <principal problem not specified>.  He presents with the following impairments/functional limitations: weakness, impaired endurance, impaired self care skills, impaired functional mobility, gait instability, impaired balance, pain.    Rehab Prognosis: Good; patient would benefit from acute skilled PT services to address these deficits and reach maximum level of function.    Recent Surgery: * No surgery found *      Plan:     During this hospitalization, patient to be seen 5 x/week to address the identified rehab impairments via gait training, therapeutic activities, therapeutic exercises, neuromuscular re-education and progress toward the following goals:    Plan of Care Expires:  05/12/23    Subjective     Chief Complaint: none  Patient/Family Comments/goals: none  Pain/Comfort:  Pain Rating 1: 6/10  Location 1: back  Pain Addressed 1: Distraction, Reposition  Pain Rating Post-Intervention 1: 6/10      Objective:     Communicated with nurse prior to session.  Patient found supine with telemetry upon PT entry to room.     General Precautions: Standard, fall  Orthopedic Precautions: spinal precautions  Braces: LSO  Respiratory Status: Room air     Functional Mobility:  Bed Mobility:     Supine to Sit: minimum assistance  Transfers:     Sit to Stand:  contact guard assistance and minimum assistance with rolling walker. Performed x20 sit<>stands with multiple extended rest breaks.   Gait: 109 ft with RW & CGA. 2 seated rest breaks. Right lateral lean  Balance: fair/poor      AM-PAC 6 CLICK MOBILITY  Turning over in bed (including adjusting bedclothes, sheets and blankets)?: 3  Sitting down on and standing  up from a chair with arms (e.g., wheelchair, bedside commode, etc.): 3  Moving from lying on back to sitting on the side of the bed?: 3  Moving to and from a bed to a chair (including a wheelchair)?: 3  Need to walk in hospital room?: 3  Climbing 3-5 steps with a railing?: 2  Basic Mobility Total Score: 17     Patient left up in chair with all lines intact, call button in reach, and son present..    GOALS:   Multidisciplinary Problems       Physical Therapy Goals          Problem: Physical Therapy    Goal Priority Disciplines Outcome Goal Variances Interventions   Physical Therapy Goal     PT, PT/OT Ongoing, Progressing     Description: Goals to be met by: 23     Patient will increase functional independence with mobility by performin. Supine to sit with Stand-by Assistance  2. Sit to supine with Stand-by Assistance  3. Sit to stand transfer with Contact Guard Assistance  4. Gait  x 200 feet with Contact Guard Assistance using Rolling Walker.                          Time Tracking:     PT Received On: 23  PT Start Time: 742     PT Stop Time: 807  PT Total Time (min): 25 min     Billable Minutes: Therapeutic Activity 25 minutes    Treatment Type: Treatment  PT/PTA: PT     Number of PTA visits since last PT visit: 3     2023

## 2023-04-14 NOTE — PLAN OF CARE
Sent requested updates via [employer] to [facility] for insurance authorization.

## 2023-04-14 NOTE — PROGRESS NOTES
[facility]  Hospital Medicine Progress Note           Chief Complaint   Back Pain (Pt to ED with c/o nontraumatic lower back pain since Friday. )     History of Present Illness   Mr. Mayes is a very poor historian, so information was gathered mostly from the medical record as there is no one at the bedside to assist with history.  He is an 88-year-old male with a PMH of CAD, SSS, CHF, CKD, DM 2, HLD, HTN, CVA who presented to the ED with complaints of low back pain getting progressively worse after a fall 2 weeks ago.  He is unsure if he has seen a medical professional regarding the back pain or the fall until today.  He states the pain is worse with movement.  He is unable to give any other specifics.     Today's ED lab work was at patient's baseline.  XR lumbar spine showed age indeterminate L3 compression fracture.  CT L-spine without contrast showed mild L3 superior endplate compression deformity is age indeterminate.  CT L-spine with contrast showed moderate L3 compression deformity with minimal retropulsion.  Mild L1 compression deformity with lucent fracture line seen.  Moderate spinal canal narrowing L3-L4 and L4-L5 secondary to disc and facet degenerative change.  During his ED stay he was noted to have urinary retention, holding approximately 700 mL of urine.  Pena catheter placed.  He was afebrile, VSS on RA.  Neurosurgery, Neurology, Urology were all consulted in the ED. LSO brace was placed per neurosurgery recommendation.  He was admitted to Hospital Medicine for management.     Patient admitted due to low back pain.  Reports a history of multiple falls.  Further imaging revealed L1 and L3 compression fracture.  Neurosurgery has been consulted awaiting recs.  Also with urinary retention status post indwelling Pena placed, urology also consulted - to continue indwelling pena, flomax.     Neurology thinks, falls probably due to  deconditioning.Follow-up MRI brain,  C-spine, L-spine with and without ordered by Neurology for further workup     Interval Hx:  Patient seen at bedside, reports constipation, pain is little better but still hurting, NSGY discussed MRI findings with the patient, no acute neurosurgical interventions at this time,If pain and symptoms continues, patient could benefit from kyphoplasty.     Chart was reviewed, HDS     Exam  General: Awake, alert, oriented, in no acute distress, non-toxic appearing. Appears comfortable, currently eating chips.  HEENT: NC/AT  Neck:  Supple  Chest: Clear bilaterally, no wheezing or rales, no accessory muscle use   CVS: Regular rhythm. Normal S1/S2.  Abdomen: nondistended, normoactive BS, soft and non-tender.  MSK: No obvious deformity or joint swelling. LSO brace in place  Skin: Warm and dry  Neuro: AAOx3, no focal neurological deficit  Psych: Cooperative       VITAL SIGNS: 24 HRS MIN & MAX LAST   Temp  Min: 97.4 °F (36.3 °C)  Max: 98 °F (36.7 °C) 97.9 °F (36.6 °C)   BP  Min: 116/55  Max: 168/78 127/68   Pulse  Min: 62  Max: 67  63   Resp  Min: 20  Max: 20 20   SpO2  Min: 92 %  Max: 98 % (!) 94 %         Recent Labs   Lab 04/10/23  1328 04/11/23  0416   WBC 7.1 5.7   RBC 4.70 4.05*   HGB 13.7* 11.8*   HCT 41.6* 36.3*   MCV 88.5 89.6   MCH 29.1 29.1   MCHC 32.9* 32.5*   RDW 16.3 16.5    129*   MPV 10.5* 10.3       Recent Labs   Lab 04/10/23  1333 04/11/23  0416 04/12/23  0453 04/13/23  1452 04/14/23  0407    139 135* 137 140   K 4.1 3.7 4.5 4.9 4.4   CO2 30 29 27 28 26   BUN 20.7 20.0 22.2 20.7 19.6   CREATININE 1.48* 1.47* 1.36* 1.26* 1.31*   CALCIUM 9.2 8.9 8.4* 8.7* 8.3*   MG  --  1.90  --   --   --    ALBUMIN 3.4 2.8*  --   --   --    ALKPHOS 159* 146  --   --   --    ALT 53 44  --   --   --    AST 54* 48*  --   --   --    BILITOT 0.9 0.7  --   --   --           Microbiology Results (last 7 days)       ** No results found for the last 168 hours. **             See below for Radiology    Scheduled Med:    "acetaminophen  1,000 mg Oral TID    amiodarone  200 mg Oral BID    apixaban  5 mg Oral BID    cetirizine  10 mg Oral Daily    multivitamin  1 tablet Oral Daily    mupirocin   Nasal BID    polyethylene glycol  17 g Oral BID    potassium chloride  20 mEq Oral Daily    senna  8.6 mg Oral Daily    tamsulosin  0.4 mg Oral QHS        Continuous Infusions:   sodium chloride 0.9% 50 mL/hr at 04/12/23 0549        PRN Meds:  acetaminophen, aluminum-magnesium hydroxide-simethicone, dextrose 10%, dextrose 10%, glucagon (human recombinant), glucose, glucose, hydrALAZINE, insulin aspart U-100, labetaloL, melatonin, naloxone, ondansetron, oxyCODONE, polyethylene glycol, prochlorperazine, simethicone            Assessment & Plan   Status post multiple falls   L1 and L3 compression fracture - age indeterminate   Spinal stenosis L3-L4 and L4-L5 region  Urinary retention status post indwelling Mendez   Mild ANMOL versus CKD   Prolonged QT     History:  CAD, SSS, CHF, CKD, DM 2, HLD, HTN, CVA     Plan:  Neurosurgery evaluated no surgical interventions necessary, recommend  to continue use of LSO brace ,If pain and symptoms continues, patient could benefit from kyphoplasty.   Pain Control , Monitor  Bowel Movements  Mendez catheter in place, Urology have seen the patient. Their recs are as follows" If he is here at the end of the week, can offer voiding trial prior to discharge. If he is discharged sooner than this, he can f/u in 1-2 weeks in the office for voiding trial"  Holding  Lasix and losartan, avoid nephrotoxic, continue normal saline at 50 cc/hour and trend BMP , f/u Urine Studies  Sliding scale insulin with accu-checks.  Antihypertensives as needed  Resumed other home meds as appropriate  PT/OT to continue  Waiting for placement     VTE Prophylaxis: Continue home Eliquis       Patient condition:  Stable    Anticipated discharge and Disposition:     SNF    All diagnosis and differential diagnosis have been reviewed; assessment and " plan has been documented; I have personally reviewed the labs and test results that are presently available; I have reviewed the patients medication list; I have reviewed the consulting providers response and recommendations. I have reviewed or attempted to review medical records based upon their availability    All of the patient's questions have been  addressed and answered. Patient's is agreeable to the above stated plan. I will continue to monitor closely and make adjustments to medical management as needed.  _____________________________________________________________________    Nutrition Status:    Radiology:  Echo  · The estimated ejection fraction is 35-40%.  · Concentric hypertrophy and moderately decreased systolic function.  · Grade I left ventricular diastolic dysfunction.  · Normal right ventricular size with normal right ventricular systolic   function.  · Mild mitral regurgitation.  · Mild aortic regurgitation.  · Normal central venous pressure (3 mmHg).  · The estimated PA systolic pressure is 14 mmHg.         Argelia Narvaez MD   04/14/2023

## 2023-04-15 LAB
ANION GAP SERPL CALC-SCNC: 6 MEQ/L
BUN SERPL-MCNC: 19.2 MG/DL (ref 8.4–25.7)
CALCIUM SERPL-MCNC: 8.4 MG/DL (ref 8.8–10)
CHLORIDE SERPL-SCNC: 105 MMOL/L (ref 98–107)
CO2 SERPL-SCNC: 28 MMOL/L (ref 23–31)
CREAT SERPL-MCNC: 1.38 MG/DL (ref 0.73–1.18)
CREAT UR-MCNC: 74.7 MG/DL (ref 63–166)
CREAT/UREA NIT SERPL: 14
GFR SERPLBLD CREATININE-BSD FMLA CKD-EPI: 49 MLS/MIN/1.73/M2
GLUCOSE SERPL-MCNC: 98 MG/DL (ref 82–115)
OSMOLALITY UR: 380 MOSM/KG (ref 300–1300)
POCT GLUCOSE: 101 MG/DL (ref 70–110)
POCT GLUCOSE: 160 MG/DL (ref 70–110)
POTASSIUM SERPL-SCNC: 4.8 MMOL/L (ref 3.5–5.1)
SODIUM SERPL-SCNC: 139 MMOL/L (ref 136–145)
SODIUM UR-SCNC: 40 MMOL/L
UUN UR-MCNC: 472 MG/DL

## 2023-04-15 PROCEDURE — 82570 ASSAY OF URINE CREATININE: CPT | Performed by: INTERNAL MEDICINE

## 2023-04-15 PROCEDURE — 25000003 PHARM REV CODE 250: Performed by: INTERNAL MEDICINE

## 2023-04-15 PROCEDURE — 83935 ASSAY OF URINE OSMOLALITY: CPT | Performed by: INTERNAL MEDICINE

## 2023-04-15 PROCEDURE — G0378 HOSPITAL OBSERVATION PER HR: HCPCS

## 2023-04-15 PROCEDURE — 84300 ASSAY OF URINE SODIUM: CPT | Performed by: INTERNAL MEDICINE

## 2023-04-15 PROCEDURE — 84520 ASSAY OF UREA NITROGEN: CPT | Mod: 91 | Performed by: INTERNAL MEDICINE

## 2023-04-15 PROCEDURE — 25000003 PHARM REV CODE 250: Performed by: NURSE PRACTITIONER

## 2023-04-15 PROCEDURE — 80048 BASIC METABOLIC PNL TOTAL CA: CPT | Performed by: INTERNAL MEDICINE

## 2023-04-15 RX ORDER — BISACODYL 10 MG
10 SUPPOSITORY, RECTAL RECTAL ONCE
Status: COMPLETED | OUTPATIENT
Start: 2023-04-15 | End: 2023-04-15

## 2023-04-15 RX ORDER — AMLODIPINE BESYLATE 5 MG/1
5 TABLET ORAL DAILY
Status: DISCONTINUED | OUTPATIENT
Start: 2023-04-15 | End: 2023-04-17 | Stop reason: HOSPADM

## 2023-04-15 RX ADMIN — AMIODARONE HYDROCHLORIDE 200 MG: 200 TABLET ORAL at 09:04

## 2023-04-15 RX ADMIN — POLYETHYLENE GLYCOL 3350 17 G: 17 POWDER, FOR SOLUTION ORAL at 09:04

## 2023-04-15 RX ADMIN — APIXABAN 5 MG: 5 TABLET, FILM COATED ORAL at 09:04

## 2023-04-15 RX ADMIN — CETIRIZINE HYDROCHLORIDE 10 MG: 10 TABLET, FILM COATED ORAL at 09:04

## 2023-04-15 RX ADMIN — ACETAMINOPHEN 1000 MG: 500 TABLET, FILM COATED ORAL at 09:04

## 2023-04-15 RX ADMIN — POTASSIUM CHLORIDE 20 MEQ: 1500 TABLET, EXTENDED RELEASE ORAL at 09:04

## 2023-04-15 RX ADMIN — OXYCODONE 5 MG: 5 TABLET ORAL at 02:04

## 2023-04-15 RX ADMIN — SENNOSIDES 8.6 MG: 8.6 TABLET, FILM COATED ORAL at 09:04

## 2023-04-15 RX ADMIN — TAMSULOSIN HYDROCHLORIDE 0.4 MG: 0.4 CAPSULE ORAL at 09:04

## 2023-04-15 RX ADMIN — ACETAMINOPHEN 1000 MG: 500 TABLET, FILM COATED ORAL at 07:04

## 2023-04-15 RX ADMIN — BISACODYL 10 MG: 10 SUPPOSITORY RECTAL at 09:04

## 2023-04-15 RX ADMIN — MUPIROCIN: 20 OINTMENT TOPICAL at 09:04

## 2023-04-15 RX ADMIN — AMLODIPINE BESYLATE 5 MG: 5 TABLET ORAL at 11:04

## 2023-04-15 RX ADMIN — THERA TABS 1 TABLET: TAB at 09:04

## 2023-04-15 NOTE — PROGRESS NOTES
"Inpatient Nutrition Evaluation    Admit Date: 4/10/2023   Total duration of encounter: 5 days    Nutrition Recommendation/Prescription     Continue heart healthy, diabetic diet as tolerated  RD to monitor po intake and weight changes    Nutrition Assessment     Chart Review    Reason Seen: length of stay    Malnutrition Screening Tool Results   Have you recently lost weight without trying?: No  Have you been eating poorly because of a decreased appetite?: No   MST Score: 0     Diagnosis:  Frequent falls  L1 and L3 compression fractures  Spinal stenosis L3-L4 and L4-L5 region  Urinary retention s/p indwelling Mendez  Prolong QT    Relevant Medical History: CAD, SSS, CHF, CKD, DM 2, HLD, HTN, CVA    Nutrition-Related Medications: suppository, insulin PRN, MVI, Miralax, KCl, senna    Nutrition-Related Labs: 4/15: creat-1.38, leonard-8.4      Diet Order: Diet heart healthy Diabetic  Oral Supplement Order: none  Appetite/Oral Intake: good/% of meals  Factors Affecting Nutritional Intake: constipation  Food/Gnosticist/Cultural Preferences: none reported  Food Allergies: none reported    Skin Integrity: bruised (ecchymotic)  Wound(s):       Comments    4/15: pt and wife report good appetite currently and prior admission. Pt with constipation, meds noted. UBW reported 175-180 lb, stating weight fluctuates r/t fluid changes. Wife reports weight is stable. Weight of 79.4 kg (175 lb) on 4/11 and previous weight of 170 lb on 9/28. Per EMR weights, possible 12% weight loss in 10 months noted, not significant at this time. Will continue to monitor.    Anthropometrics    Height: 5' 10" (177.8 cm) Height Method: Stated  Last Weight: 79.4 kg (175 lb) (04/11/23 0045) Weight Method: Standard Scale  BMI (Calculated): 25.1  BMI Classification: overweight (BMI 25-29.9)        Ideal Body Weight (IBW), Male: 166 lb     % Ideal Body Weight, Male (lb): 105.42 %                          Usual Weight Provided By: family/caregiver and EMR " weight history    Wt Readings from Last 5 Encounters:   04/11/23 79.4 kg (175 lb)   09/28/22 77.1 kg (170 lb)   06/05/22 90.7 kg (200 lb)     Weight Change(s) Since Admission:  Admit Weight: 79.4 kg (175 lb) (04/10/23 1211)      Patient Education    Not applicable.    Monitoring & Evaluation     Dietitian will monitor food and beverage intake and weight change.  Nutrition Risk/Follow-Up: low (follow-up in 5-7 days)  Patients assigned 'low nutrition risk' status do not qualify for a full nutritional assessment but will be monitored and re-evaluated in a 5-7 day time period. Please consult if re-evaluation needed sooner.    Precious Whitman, Registration Eligible, Provisional LDN

## 2023-04-15 NOTE — PROGRESS NOTES
[facility]  Hospital Medicine Progress Note           Chief Complaint   Back Pain (Pt to ED with c/o nontraumatic lower back pain since Friday. )     History of Present Illness   Mr. Mayes is a very poor historian, so information was gathered mostly from the medical record as there is no one at the bedside to assist with history.  He is an 88-year-old male with a PMH of CAD, SSS, CHF, CKD, DM 2, HLD, HTN, CVA who presented to the ED with complaints of low back pain getting progressively worse after a fall 2 weeks ago.  He is unsure if he has seen a medical professional regarding the back pain or the fall until today.  He states the pain is worse with movement.  He is unable to give any other specifics.     Today's ED lab work was at patient's baseline.  XR lumbar spine showed age indeterminate L3 compression fracture.  CT L-spine without contrast showed mild L3 superior endplate compression deformity is age indeterminate.  CT L-spine with contrast showed moderate L3 compression deformity with minimal retropulsion.  Mild L1 compression deformity with lucent fracture line seen.  Moderate spinal canal narrowing L3-L4 and L4-L5 secondary to disc and facet degenerative change.  During his ED stay he was noted to have urinary retention, holding approximately 700 mL of urine.  Pena catheter placed.  He was afebrile, VSS on RA.  Neurosurgery, Neurology, Urology were all consulted in the ED. LSO brace was placed per neurosurgery recommendation.  He was admitted to Hospital Medicine for management.     Patient admitted due to low back pain.  Reports a history of multiple falls.  Further imaging revealed L1 and L3 compression fracture.  Neurosurgery has been consulted awaiting recs.  Also with urinary retention status post indwelling Pena placed, urology also consulted - to continue indwelling pena, flomax.     Neurology thinks, falls probably due to  deconditioning.Follow-up MRI brain,  C-spine, L-spine with and without ordered by Neurology for further workup     NSGY discussed MRI findings with the patient,NSGY does not think he needs neurosurgical intervention, recommend to continue PT and LSO bracing, if pain and symptoms continues, patient could benefit from kyphoplasty from their note.(From 4/14/23)    Waiting for placement    Interval Hx:  Patient seen at bedside, reports constipation, pain is little better , no acute neurosurgical interventions at this time    Chart was reviewed, HDS, mild elevation in BP here and there likely 2/2 pain.     Exam  General: Awake, alert, oriented, in no acute distress, non-toxic appearing. Appears comfortable,ALLEN  HEENT: NC/AT  Neck:  Supple  Chest: Clear bilaterally, no wheezing or rales, no accessory muscle use   CVS: Regular rhythm. Normal S1/S2.  Abdomen: nondistended, normoactive BS, soft and non-tender.  MSK: No obvious deformity or joint swelling. LSO brace in place  Skin: Warm and dry  Neuro: AAOx3, strength 4/5 throughout in upper and lower extremities.  Psych: Cooperative       VITAL SIGNS: 24 HRS MIN & MAX LAST   Temp  Min: 97.6 °F (36.4 °C)  Max: 98.1 °F (36.7 °C) 97.6 °F (36.4 °C)   BP  Min: 126/68  Max: 155/75 (!) 152/78   Pulse  Min: 63  Max: 76  63   Resp  Min: 16  Max: 20 18   SpO2  Min: 91 %  Max: 95 % (!) 92 %         Recent Labs   Lab 04/10/23  1328 04/11/23  0416   WBC 7.1 5.7   RBC 4.70 4.05*   HGB 13.7* 11.8*   HCT 41.6* 36.3*   MCV 88.5 89.6   MCH 29.1 29.1   MCHC 32.9* 32.5*   RDW 16.3 16.5    129*   MPV 10.5* 10.3       Recent Labs   Lab 04/10/23  1333 04/11/23  0416 04/12/23  0453 04/13/23  1452 04/14/23  0407 04/15/23  0407    139   < > 137 140 139   K 4.1 3.7   < > 4.9 4.4 4.8   CO2 30 29   < > 28 26 28   BUN 20.7 20.0   < > 20.7 19.6 19.2   CREATININE 1.48* 1.47*   < > 1.26* 1.31* 1.38*   CALCIUM 9.2 8.9   < > 8.7* 8.3* 8.4*   MG  --  1.90  --   --   --   --    ALBUMIN 3.4 2.8*  --   --   --   --    ALKPHOS 159* 146  " --   --   --   --    ALT 53 44  --   --   --   --    AST 54* 48*  --   --   --   --    BILITOT 0.9 0.7  --   --   --   --     < > = values in this interval not displayed.          Microbiology Results (last 7 days)       ** No results found for the last 168 hours. **             See below for Radiology    Scheduled Med:   acetaminophen  1,000 mg Oral TID    amiodarone  200 mg Oral BID    apixaban  5 mg Oral BID    cetirizine  10 mg Oral Daily    multivitamin  1 tablet Oral Daily    mupirocin   Nasal BID    polyethylene glycol  17 g Oral BID    potassium chloride  20 mEq Oral Daily    senna  8.6 mg Oral BID    tamsulosin  0.4 mg Oral QHS        Continuous Infusions:   sodium chloride 0.9% 50 mL/hr at 04/12/23 0549        PRN Meds:  acetaminophen, aluminum-magnesium hydroxide-simethicone, dextrose 10%, dextrose 10%, glucagon (human recombinant), glucose, glucose, hydrALAZINE, insulin aspart U-100, labetaloL, melatonin, naloxone, oxyCODONE, polyethylene glycol, simethicone            Assessment & Plan   Status post multiple falls   L1 and L3 compression fracture - age indeterminate   Spinal stenosis L3-L4 and L4-L5 region  Urinary retention status post indwelling Mendez   Mild ANMOL versus CKD   Prolonged QT  Constipation     History:  CAD, SSS, CHF, CKD, DM 2, HLD, HTN, CVA     Plan:  Neurosurgery evaluated no surgical interventions necessary, recommend  to continue use of LSO brace ,If pain and symptoms continues, patient could benefit from kyphoplasty.   Pain Control with current analgesics, Monitor  Bowel Movements, gave methylnatrexone  Mendez catheter in place, Urology have seen the patient. Their recs are as follows" If he is here at the end of the week, can offer voiding trial prior to discharge. If he is discharged sooner than this, he can f/u in 1-2 weeks in the office for voiding trial", communicated this with the staff  Holding  Lasix and losartan, avoid nephrotoxic ,appears close to baseline, f/u Urine " Studies, renal USG  Sliding scale insulin with accu-checks.  Antihypertensives as needed, Substituted nephrotoxins with Amlodipine  Resumed other home meds as appropriate  PT/OT to continue  Waiting for placement     VTE Prophylaxis: Continue home Eliquis       Patient condition:  Stable    Anticipated discharge and Disposition:     [facility]    All diagnosis and differential diagnosis have been reviewed; assessment and plan has been documented; I have personally reviewed the labs and test results that are presently available; I have reviewed the patients medication list; I have reviewed the consulting providers response and recommendations. I have reviewed or attempted to review medical records based upon their availability    All of the patient's questions have been  addressed and answered. Patient's is agreeable to the above stated plan. I will continue to monitor closely and make adjustments to medical management as needed.  _____________________________________________________________________    Nutrition Status:    Radiology:  Echo  · The estimated ejection fraction is 35-40%.  · Concentric hypertrophy and moderately decreased systolic function.  · Grade I left ventricular diastolic dysfunction.  · Normal right ventricular size with normal right ventricular systolic   function.  · Mild mitral regurgitation.  · Mild aortic regurgitation.  · Normal central venous pressure (3 mmHg).  · The estimated PA systolic pressure is 14 mmHg.         Argelia Narvaez MD   04/15/2023

## 2023-04-15 NOTE — PROGRESS NOTES
[facility] - 9th [facility] Med Surg  Neurosurgery  Progress Note    Subjective:     Interval History: No acute events. Patient resting in bed. Has been working with therapy. Patient complains of continued lumbar pain. Denies LE weakness or numbness. No family at bedside.     History of Present Illness: This is an 88-year-old  male with past medical history significant for CAD, sick sinus syndrome, CHF, CKD, diabetes type 2, hyperlipidemia, hypertension, CVA who presented to the ED with lower back pain that is becoming progressively worse over about the past week.  Patient is an extremely poor historian and confused.  Wife is at the bedside and also seems a bit confused at times.  She does provide some of the history.  Labs and imaging were performed.  An x-ray of the lumbar spine demonstrated an age indeterminate L3 compression fracture.  CT of the lumbar spine without contrast demonstrated mild L3 superior endplate compression deformity age indeterminate.  During his visit in the emergency department he was noted to have urinary retention and had approximately 700 mL of urine in bladder.  Mendez was placed.  Urology was consulted.  An LSO brace was ordered.  Patient admitted to hospital medicine.  Neurosurgery was consulted.     Urology started Flomax and recommended continuing the Mendez.  They will offer a voiding trial prior to being discharged.  Retention secondary to pain and decreased mobility.    Post-Op Info:  * No surgery found *          Medications:  Continuous Infusions:   sodium chloride 0.9% 50 mL/hr at 04/12/23 0549     Scheduled Meds:   acetaminophen  1,000 mg Oral TID    amiodarone  200 mg Oral BID    apixaban  5 mg Oral BID    cetirizine  10 mg Oral Daily    multivitamin  1 tablet Oral Daily    mupirocin   Nasal BID    polyethylene glycol  17 g Oral BID    potassium chloride  20 mEq Oral Daily    senna  8.6 mg Oral BID    tamsulosin  0.4 mg Oral QHS     PRN Meds:acetaminophen,  "aluminum-magnesium hydroxide-simethicone, dextrose 10%, dextrose 10%, glucagon (human recombinant), glucose, glucose, hydrALAZINE, insulin aspart U-100, labetaloL, melatonin, naloxone, oxyCODONE, polyethylene glycol, simethicone       Objective:     Weight: 79.4 kg (175 lb)  Body mass index is 25.11 kg/m².  Vital Signs (Most Recent):  Temp: 97.6 °F (36.4 °C) (04/15/23 0722)  Pulse: 63 (04/15/23 0722)  Resp: 16 (04/14/23 2131)  BP: (!) 152/78 (04/15/23 0722)  SpO2: (!) 92 % (04/15/23 0722)   Vital Signs (24h Range):  Temp:  [97.6 °F (36.4 °C)-98 °F (36.7 °C)] 97.6 °F (36.4 °C)  Pulse:  [63-76] 63  Resp:  [16-20] 16  SpO2:  [92 %-95 %] 92 %  BP: (126-152)/(68-78) 152/78                              Urethral Catheter 04/10/23 1941 Silicone 16 Fr. (Active)   Site Assessment Clean;Intact 04/14/23 2000   Collection Container Standard drainage bag 04/14/23 2000   Securement Method secured to top of thigh w/ adhesive device 04/14/23 2000   Catheter Care Performed yes 04/14/23 2000   Reason for Continuing Urinary Catheterization Urinary retention 04/14/23 2000   CAUTI Prevention Bundle Securement Device in place with 1" slack;Drainage bag/urimeter off the floor;Sheeting clip in use 04/14/23 2000   Output (mL) 950 mL 04/11/23 0200       Neurosurgery Physical Exam  NAD. Awake,alert  EOMI. Speech normal.   Slightly confused  Motors 4/5 throughout in upper and lower extremities.   Sensation intact.   Significant Labs:  Recent Labs   Lab 04/13/23  1452 04/14/23  0407 04/15/23  0407    140 139   K 4.9 4.4 4.8   CO2 28 26 28   BUN 20.7 19.6 19.2   CREATININE 1.26* 1.31* 1.38*   CALCIUM 8.7* 8.3* 8.4*     No results for input(s): WBC, HGB, HCT, PLT in the last 48 hours.  No results for input(s): LABPT, INR, APTT in the last 48 hours.  Microbiology Results (last 7 days)       ** No results found for the last 168 hours. **            Assessment/Plan:     Active Diagnoses:    Diagnosis Date Noted POA    Falls frequently [R29.6] " 04/11/2023 Not Applicable      Problems Resolved During this Admission:     -No neurosurgical intervention planned at this time  -Continue PT and LSO bracing  -Pain control  -Fall precautions  -Would benefit from placement at discharge.     JAI Munguia  Neurosurgery  [facility] - 9th Floor Med Surg

## 2023-04-16 LAB
POCT GLUCOSE: 104 MG/DL (ref 70–110)
POCT GLUCOSE: 132 MG/DL (ref 70–110)

## 2023-04-16 PROCEDURE — 25000003 PHARM REV CODE 250: Performed by: INTERNAL MEDICINE

## 2023-04-16 PROCEDURE — 25000003 PHARM REV CODE 250: Performed by: NURSE PRACTITIONER

## 2023-04-16 PROCEDURE — 97116 GAIT TRAINING THERAPY: CPT | Mod: CQ

## 2023-04-16 PROCEDURE — G0378 HOSPITAL OBSERVATION PER HR: HCPCS

## 2023-04-16 PROCEDURE — 94761 N-INVAS EAR/PLS OXIMETRY MLT: CPT

## 2023-04-16 RX ORDER — OXYCODONE HYDROCHLORIDE 5 MG/1
5 TABLET ORAL EVERY 4 HOURS PRN
Status: DISCONTINUED | OUTPATIENT
Start: 2023-04-16 | End: 2023-04-17 | Stop reason: HOSPADM

## 2023-04-16 RX ORDER — ACETAMINOPHEN 500 MG
1000 TABLET ORAL EVERY 6 HOURS PRN
Status: DISCONTINUED | OUTPATIENT
Start: 2023-04-16 | End: 2023-04-17 | Stop reason: HOSPADM

## 2023-04-16 RX ADMIN — TAMSULOSIN HYDROCHLORIDE 0.4 MG: 0.4 CAPSULE ORAL at 08:04

## 2023-04-16 RX ADMIN — SENNOSIDES 8.6 MG: 8.6 TABLET, FILM COATED ORAL at 10:04

## 2023-04-16 RX ADMIN — Medication 6 MG: at 08:04

## 2023-04-16 RX ADMIN — POTASSIUM CHLORIDE 20 MEQ: 1500 TABLET, EXTENDED RELEASE ORAL at 10:04

## 2023-04-16 RX ADMIN — AMIODARONE HYDROCHLORIDE 200 MG: 200 TABLET ORAL at 10:04

## 2023-04-16 RX ADMIN — AMIODARONE HYDROCHLORIDE 200 MG: 200 TABLET ORAL at 08:04

## 2023-04-16 RX ADMIN — APIXABAN 5 MG: 5 TABLET, FILM COATED ORAL at 08:04

## 2023-04-16 RX ADMIN — CETIRIZINE HYDROCHLORIDE 10 MG: 10 TABLET, FILM COATED ORAL at 10:04

## 2023-04-16 RX ADMIN — AMLODIPINE BESYLATE 5 MG: 5 TABLET ORAL at 10:04

## 2023-04-16 RX ADMIN — APIXABAN 5 MG: 5 TABLET, FILM COATED ORAL at 10:04

## 2023-04-16 RX ADMIN — POLYETHYLENE GLYCOL 3350 17 G: 17 POWDER, FOR SOLUTION ORAL at 08:04

## 2023-04-16 RX ADMIN — ACETAMINOPHEN 1000 MG: 500 TABLET, FILM COATED ORAL at 05:04

## 2023-04-16 RX ADMIN — ACETAMINOPHEN 1000 MG: 500 TABLET, FILM COATED ORAL at 08:04

## 2023-04-16 RX ADMIN — POLYETHYLENE GLYCOL 3350 17 G: 17 POWDER, FOR SOLUTION ORAL at 10:04

## 2023-04-16 RX ADMIN — SENNOSIDES 8.6 MG: 8.6 TABLET, FILM COATED ORAL at 08:04

## 2023-04-16 RX ADMIN — THERA TABS 1 TABLET: TAB at 10:04

## 2023-04-16 NOTE — PT/OT/SLP PROGRESS
Physical Therapy Treatment    Patient Name:  Philip Mayes   MRN:  [id number]    Recommendations:     Discharge Recommendations: nursing facility, skilled  Discharge Equipment Recommendations: none  Barriers to discharge: Decreased caregiver support    Assessment:     Philip Mayes is a 88 y.o. male admitted with a medical diagnosis of <principal problem not specified>.  He presents with the following impairments/functional limitations: weakness, impaired functional mobility, decreased safety awareness, impaired cardiopulmonary response to activity, impaired endurance, gait instability, pain, orthopedic precautions, impaired self care skills .    Rehab Prognosis: Good; patient would benefit from acute skilled PT services to address these deficits and reach maximum level of function.    Recent Surgery: * No surgery found *      Plan:     During this hospitalization, patient to be seen 5 x/week to address the identified rehab impairments via gait training, therapeutic activities and progress toward the following goals:    Plan of Care Expires:  05/12/23    Subjective     Chief Complaint:   Patient/Family Comments/goals:   Pain/Comfort:         Objective:     Communicated with nurse prior to session.  Patient found HOB elevated with telemetry, pulse ox (continuous), peripheral IV upon PT entry to room.     General Precautions: Standard, fall  Orthopedic Precautions: spinal precautions  Braces: LSO  Respiratory Status: Room air  Blood Pressure:   Skin Integrity:       Functional Mobility:  Bed Mobility:     Supine to Sit: moderate assistance  Transfers:     Sit to Stand:  minimum assistance with rolling walker  Gait: pt amb for 83ft with Saul and RW with noted NBOS and flexed trunk posture.      Education:  Patient provided with verbal education regarding spinal pxns and safety awareness.  Understanding was verbalized, however additional teaching warranted.     Patient left up in chair with all lines intact, call  button in reach, and chair alarm on..    GOALS:   Multidisciplinary Problems       Physical Therapy Goals          Problem: Physical Therapy    Goal Priority Disciplines Outcome Goal Variances Interventions   Physical Therapy Goal     PT, PT/OT Ongoing, Progressing     Description: Goals to be met by: 23     Patient will increase functional independence with mobility by performin. Supine to sit with Stand-by Assistance  2. Sit to supine with Stand-by Assistance  3. Sit to stand transfer with Contact Guard Assistance  4. Gait  x 200 feet with Contact Guard Assistance using Rolling Walker.                          Time Tracking:     PT Received On: 23  PT Start Time: 1027     PT Stop Time: 1041  PT Total Time (min): 14 min     Billable Minutes: Gait Training 14    Treatment Type: Treatment  PT/PTA: PTA     Number of PTA visits since last PT visit: 4     2023

## 2023-04-16 NOTE — PROGRESS NOTES
Hospital Medicine  Progress Note    Patient Name: Philip Mayes  MRN: [id number]  Status: OP- Observation   Admission Date: 4/10/2023  Length of Stay: 2      CC: hospital follow-up for spinal stenosis       SUBJECTIVE    88-year-old male with a a history that includes SSS, CHF, prior CVA,  presented to the ED 4/10 with low back pain getting progressively worse after a fall 2 weeks ago.  Work up in ED included XR lumbar spine showing age indeterminate L3 compression fracture.  CT L-spine noted moderate L3 compression deformity with minimal retropulsion; mild L1 compression deformity; moderate spinal canal narrowing L3-L4 and L4-L5 secondary to disc and facet degenerative change.  During his ED stay he was noted to have urinary retention, approximately 700 mL; Mendez catheter placed.  He was admitted to Hospital Medicine services for further management.  Neurosurgery and Neurology both consulted.  Neuro deferred management to neurosurgery, who evaluated and recommended LSO brace, PT.  MRIs of the C/L spine performed.  MRI C-spine noting degenerative changes without cord stenosis.  MRI L-spine however noting acute L1 and chronic L3 compression fractures; moderate canal stenosis at L3-4, L4-5. Neurosurgery recommended continuing with LSO brace and PT/OT, possible Hypoplasty in the future.  CM consulted for rehab placement for ongoing therapy.  Urology involved due to urinary retention, recommended keeping Mendez catheter for time being, initiated on Flomax.   Patient awaiting placement    Today: Patient seen and examined at bedside, and chart reviewed.  No new issues; does complain of ongoing back pain today.  Had BM yesterday, on bowel regimen.  Mendez catheter removed overnight, urinating in small amounts so far.      MEDICATIONS   Scheduled   acetaminophen  1,000 mg Oral TID    amiodarone  200 mg Oral BID    amLODIPine  5 mg Oral Daily    apixaban  5 mg Oral BID    cetirizine  10 mg Oral Daily    multivitamin  1 tablet  Oral Daily    polyethylene glycol  17 g Oral BID    potassium chloride  20 mEq Oral Daily    senna  8.6 mg Oral BID    tamsulosin  0.4 mg Oral QHS     Continuous Infusions  None      PHYSICAL EXAM   VITALS: T 97.7 °F (36.5 °C)   BP (!) 141/65   P 60   RR 18   O2 (!) 93 %    GENERAL: Awake and in NAD  LUNGS: CTA B/L  CVS: Normal rate  GI/: Soft, NT, bowel sounds positive.  EXTREMITIES: No peripheral edema  NEURO: AAOx3  PSYCH: Cooperative      LABS   CBC  No results for input(s): WBC, RBC, HGB, HCT, MCV, MCH, MCHC, RDW, PLT, RETIC, ESR in the last 72 hours.  CHEM  Recent Labs     04/14/23  0407 04/15/23  0407    139   K 4.4 4.8   CHLORIDE 107 105   CO2 26 28   BUN 19.6 19.2   CREATININE 1.31* 1.38*   GLUCOSE 103 98   CALCIUM 8.3* 8.4*       DIAGNOSTICS   US Retroperitoneal Complete  Narrative:   The right kidney measures  11.7 cm and the left kidney measures 10.7 cm.  The kidneys are poorly visualized due to overlying bowel gas.  There is some cortical atrophy seen bilaterally.  No hydronephrosis is seen.  No hydroureter is seen.  No abnormal calcifications are seen.  There is a cyst seen in the right kidney in the midpole that measures 2.6 x 2.8 cm.  There is another simple cyst seen in the upper pole that measures 1.7 x 1.6 cm.  There is a simple cyst seen in the left kidney in the midpole that measures 2.3 x 2.7 cm.  Flow to both kidneys appears unremarkable..  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  Bilateral renal cysts  Limited examination due to overlying bowel gas  Electronically signed by: Tanja Ramsey  Date:    04/15/2023  Time:    11:53      ASSESSMENT   Physical deconditioning with recurrent falls   Acute L1 and chronic L3 vertebral compression fractures   Lumbar Spinal stenosis, L3-L4, L4-L5  Acute Urinary retention status post indwelling Mendez   Prolonged QT  PAF/SSS s/p PPM, on Eliquis  HFrEF, compensated  CKD  III  Essential HTN  NIDDM II  h/o CVA    PLAN   Continue with voiding trial today, monitor urine output  Otherwise awaiting placement, will continue current management including therapy services and analgesics as needed in the interim      Prophylaxis: Home [place] MD  [facility]

## 2023-04-17 VITALS
BODY MASS INDEX: 25.05 KG/M2 | OXYGEN SATURATION: 95 % | HEART RATE: 62 BPM | WEIGHT: 175 LBS | RESPIRATION RATE: 18 BRPM | TEMPERATURE: 98 F | DIASTOLIC BLOOD PRESSURE: 74 MMHG | SYSTOLIC BLOOD PRESSURE: 157 MMHG | HEIGHT: 70 IN

## 2023-04-17 LAB
POCT GLUCOSE: 100 MG/DL (ref 70–110)
POCT GLUCOSE: 111 MG/DL (ref 70–110)
POCT GLUCOSE: 147 MG/DL (ref 70–110)
POCT GLUCOSE: 153 MG/DL (ref 70–110)

## 2023-04-17 PROCEDURE — 30200315 PPD INTRADERMAL TEST REV CODE 302: Performed by: INTERNAL MEDICINE

## 2023-04-17 PROCEDURE — 97530 THERAPEUTIC ACTIVITIES: CPT | Mod: CO

## 2023-04-17 PROCEDURE — 96372 THER/PROPH/DIAG INJ SC/IM: CPT | Performed by: NURSE PRACTITIONER

## 2023-04-17 PROCEDURE — 97116 GAIT TRAINING THERAPY: CPT | Mod: CQ

## 2023-04-17 PROCEDURE — 25000003 PHARM REV CODE 250: Performed by: INTERNAL MEDICINE

## 2023-04-17 PROCEDURE — G0378 HOSPITAL OBSERVATION PER HR: HCPCS

## 2023-04-17 PROCEDURE — 63600175 PHARM REV CODE 636 W HCPCS: Performed by: NURSE PRACTITIONER

## 2023-04-17 PROCEDURE — 97535 SELF CARE MNGMENT TRAINING: CPT | Mod: CO

## 2023-04-17 PROCEDURE — 86580 TB INTRADERMAL TEST: CPT | Performed by: INTERNAL MEDICINE

## 2023-04-17 PROCEDURE — 25000003 PHARM REV CODE 250: Performed by: NURSE PRACTITIONER

## 2023-04-17 RX ORDER — SENNOSIDES 8.6 MG/1
1 TABLET ORAL 2 TIMES DAILY
Start: 2023-04-17

## 2023-04-17 RX ORDER — TAMSULOSIN HYDROCHLORIDE 0.4 MG/1
0.4 CAPSULE ORAL NIGHTLY
Start: 2023-04-17 | End: 2024-04-16

## 2023-04-17 RX ORDER — POLYETHYLENE GLYCOL 3350 17 G/17G
17 POWDER, FOR SOLUTION ORAL 2 TIMES DAILY PRN
Refills: 0
Start: 2023-04-17

## 2023-04-17 RX ORDER — AMLODIPINE BESYLATE 5 MG/1
5 TABLET ORAL DAILY
Qty: 30 TABLET | Refills: 0
Start: 2023-04-18 | End: 2024-04-17

## 2023-04-17 RX ORDER — OXYCODONE HYDROCHLORIDE 5 MG/1
5 TABLET ORAL EVERY 6 HOURS PRN
Qty: 30 TABLET | Refills: 0 | Status: SHIPPED | OUTPATIENT
Start: 2023-04-17

## 2023-04-17 RX ADMIN — TUBERCULIN PURIFIED PROTEIN DERIVATIVE 5 UNITS: 5 INJECTION, SOLUTION INTRADERMAL at 10:04

## 2023-04-17 RX ADMIN — AMIODARONE HYDROCHLORIDE 200 MG: 200 TABLET ORAL at 08:04

## 2023-04-17 RX ADMIN — POLYETHYLENE GLYCOL 3350 17 G: 17 POWDER, FOR SOLUTION ORAL at 08:04

## 2023-04-17 RX ADMIN — INSULIN ASPART 2 UNITS: 100 INJECTION, SOLUTION INTRAVENOUS; SUBCUTANEOUS at 12:04

## 2023-04-17 RX ADMIN — APIXABAN 5 MG: 5 TABLET, FILM COATED ORAL at 08:04

## 2023-04-17 RX ADMIN — CETIRIZINE HYDROCHLORIDE 10 MG: 10 TABLET, FILM COATED ORAL at 08:04

## 2023-04-17 RX ADMIN — SENNOSIDES 8.6 MG: 8.6 TABLET, FILM COATED ORAL at 08:04

## 2023-04-17 RX ADMIN — POTASSIUM CHLORIDE 20 MEQ: 1500 TABLET, EXTENDED RELEASE ORAL at 08:04

## 2023-04-17 RX ADMIN — THERA TABS 1 TABLET: TAB at 08:04

## 2023-04-17 RX ADMIN — OXYCODONE 5 MG: 5 TABLET ORAL at 02:04

## 2023-04-17 RX ADMIN — OXYCODONE 5 MG: 5 TABLET ORAL at 12:04

## 2023-04-17 RX ADMIN — AMLODIPINE BESYLATE 5 MG: 5 TABLET ORAL at 08:04

## 2023-04-17 NOTE — PLAN OF CARE
Discharge packet sent to Dolores at [facility] via [facility]. Awaiting who to call report to at this time.

## 2023-04-17 NOTE — PT/OT/SLP PROGRESS
Physical Therapy Treatment    Patient Name:  Philip Mayes   MRN:  [id number]    Recommendations:     Discharge Recommendations: nursing facility, skilled  Discharge Equipment Recommendations: none  Barriers to discharge: Decreased caregiver support    Assessment:     Philip Mayes is a 88 y.o. male admitted with a medical diagnosis of Falls frequently.  He presents with the following impairments/functional limitations: weakness, impaired functional mobility, decreased safety awareness, impaired cardiopulmonary response to activity, impaired cognition, impaired endurance, gait instability, impaired self care skills, decreased lower extremity function, pain .    Rehab Prognosis: Good; patient would benefit from acute skilled PT services to address these deficits and reach maximum level of function.    Recent Surgery: * No surgery found *      Plan:     During this hospitalization, patient to be seen 5 x/week to address the identified rehab impairments via gait training, therapeutic activities and progress toward the following goals:    Plan of Care Expires:  05/12/23    Subjective     Chief Complaint:   Patient/Family Comments/goals:   Pain/Comfort:         Objective:     Communicated with nurse prior to session.  Patient found HOB elevated with telemetry, pulse ox (continuous), peripheral IV upon PT entry to room.     General Precautions: Standard, fall  Orthopedic Precautions: spinal precautions  Braces: LSO  Respiratory Status: Room air  Blood Pressure:   Skin Integrity:  Noted blister and redness at top of gluteal cleft, nursing informed       Functional Mobility:  Bed Mobility:     Supine to Sit: minimum assistance  Transfers:     Sit to Stand:  minimum assistance with rolling walker  Gait: pt amb with RW and Saul for 85ft with noted increased flexed trunk posture; pt required frequent verbal and tactile cues to maintain trunk extension        Education:  Patient provided with verbal education regarding  spinal pxns.  Understanding was verbalized, however additional teaching warranted.     Patient left up in chair with all lines intact, call button in reach, and chair alarm on..    GOALS:   Multidisciplinary Problems       Physical Therapy Goals          Problem: Physical Therapy    Goal Priority Disciplines Outcome Goal Variances Interventions   Physical Therapy Goal     PT, PT/OT Ongoing, Progressing     Description: Goals to be met by: 23     Patient will increase functional independence with mobility by performin. Supine to sit with Stand-by Assistance  2. Sit to supine with Stand-by Assistance  3. Sit to stand transfer with Contact Guard Assistance  4. Gait  x 200 feet with Contact Guard Assistance using Rolling Walker.                          Time Tracking:     PT Received On: 23  PT Start Time: 942     PT Stop Time: 958  PT Total Time (min): 16 min     Billable Minutes: Gait Training 16    Treatment Type: Treatment  PT/PTA: PTA     Number of PTA visits since last PT visit: 2023

## 2023-04-17 NOTE — PLAN OF CARE
04/17/23 1142   Final Note   Assessment Type Final Discharge Note   Anticipated Discharge Disposition SNF   Post-Acute Status   Post-Acute Authorization Placement   Post-Acute Placement Status Set-up Complete/Auth obtained  ([facility])     Transport via [facility]

## 2023-04-17 NOTE — PT/OT/SLP PROGRESS
Occupational Therapy   Treatment    Name: Philip Mayes  MRN: [id number]  Admitting Diagnosis:  Falls frequently       Recommendations:     Discharge Recommendations: nursing facility, skilled  Discharge Equipment Recommendations:  walker, rolling  Barriers to discharge:       Assessment:     Philip Mayes is a 88 y.o. male with a medical diagnosis of Falls frequently. Performance deficits affecting function are weakness, gait instability, decreased lower extremity function, impaired balance, impaired endurance, decreased safety awareness, impaired self care skills, impaired functional mobility.     Rehab Prognosis:  Good; patient would benefit from acute skilled OT services to address these deficits and reach maximum level of function.       Plan:     Patient to be seen 3 x/week, 5 x/week to address the above listed problems via self-care/home management, therapeutic activities, therapeutic exercises  Plan of Care Expires: 05/11/23  Plan of Care Reviewed with: patient    Subjective     Pain/Comfort:  No complaints    Objective:     Communicated with: nurse prior to session.  Patient found up in chair with telemetry, pulse ox (continuous), peripheral IV upon OT entry to room.    General Precautions: Standard, fall    Orthopedic Precautions:spinal precautions  Braces: LSO  Respiratory Status: Room air     Occupational Performance:     Bed Mobility:    Sit to supinewith Min A, standing and stepping to left to head of bed with Min A with RW     Functional Mobility/Transfers:  Functional mobility in room, ambulated approximately 15 feet, patient became fatigued and returned to edge of bed    Activities of Daily Living:  Don/doff LSO with Min/Mod A    Therapeutic Positioning  Skin integrity: Visible skin intact     The following interventions were performed in an effort to prevent and/or reduce acquired pressure ulcers: all visible skin was assessed during the course of treatment      [facility] 6 Click ADL: 16    Patient  Education:  Patient provided with verbal and demonstration education regarding safety.  Understanding was verbalized, however additional teaching warranted.      Patient left HOB elevated with all lines intact and call button in reach    GOALS:   Multidisciplinary Problems       Occupational Therapy Goals          Problem: Occupational Therapy    Goal Priority Disciplines Outcome Interventions   Occupational Therapy Goal     OT, PT/OT Ongoing, Progressing    Description: Goals to be met by: 5/11/23     Patient will increase functional independence with ADLs by performing:    UE Dressing with Modified Armstrong. LSO  LE Dressing with Modified Armstrong.  Grooming while standing with Modified Armstrong.  Toileting from toilet with Modified Armstrong for hygiene and clothing management.   Toilet transfer to toilet with Modified Armstrong.                         Time Tracking:     OT Date of Treatment: 04/17/23  OT Start Time: 1411  OT Stop Time: 1435  OT Total Time (min): 24 min    Billable Minutes:Self Care/Home Management 11  Therapeutic Activity 13    OT/ZACHARY: ZACHARY     Number of ZACHARY visits since last OT visit: 2    4/17/2023

## 2023-04-17 NOTE — PLAN OF CARE
Updates sent to Dolores at [facility] via [employer]. Awaiting insurance auth decision at this time.

## 2023-05-02 NOTE — DISCHARGE SUMMARY
Hospital Medicine  Discharge Summary    Patient Name: Philip Mayes  MRN: [id number]  Admit Date: 4/10/2023  Discharge Date: 4/17/2023   Status: OP- Observation   Length of Stay: 2      PHYSICIANS   Admitting Physician: Lili Menezes MD  Discharging Physician: Gregorio South MD.  Primary Care Physician: GOLDY PEREIRA  Consults: Neurology, Neurosurgery, and Urology      DISCHARGE DIAGNOSES   Physical deconditioning with recurrent falls   Acute L1 and chronic L3 vertebral compression fractures   Lumbar Spinal stenosis, L3-L4, L4-L5  Acute Urinary retention status post indwelling Mendez   Prolonged QT  PAF/SSS s/p PPM, on Eliquis  HFrEF, compensated  CKD III  Essential HTN  NIDDM II  h/o CVA      PROCEDURES   None      HOSPITAL COURSE    88-year-old male with a history that includes SSS, CHF, prior CVA,  presented to the ED 4/10 with low back pain getting progressively worse after a fall 2 weeks ago.  Work up in ED included XR lumbar spine showing age indeterminate L3 compression fracture.  CT L-spine noted moderate L3 compression deformity with minimal retropulsion; mild L1 compression deformity; moderate spinal canal narrowing L3-L4 and L4-L5 secondary to disc and facet degenerative change.  During his ED stay he was noted to have urinary retention, approximately 700 mL; Mendez catheter placed.  He was admitted to Hospital Medicine services for further management.  Neurosurgery and Neurology both consulted.  Neuro deferred management to neurosurgery, who evaluated and recommended LSO brace, PT.  MRIs of the C/L spine performed.  MRI C-spine noting degenerative changes without cord stenosis.  MRI L-spine however noting acute L1 and chronic L3 compression fractures; moderate canal stenosis at L3-4, L4-5. Neurosurgery recommended continuing with LSO brace and PT/OT, possible Hypoplasty in the future.  CM consulted for rehab placement for ongoing therapy.  Urology involved due to urinary retention, recommended  keeping Mendez catheter for time being, initiated on Flomax.   Patient awaiting placement, stable for transfer.      STATUS  Improved    DISPOSITION  Discharge to home    DIET  Cardiac    ACTIVITY  As tolerated      FOLLOW-UP      GOLDY PEREIRA Follow up.    Specialty: Family Medicine  Why: [facility] discharge  Contact information:  [address]  [facility] [address]  [phone]                 DISCHARGE MEDICATION RECONCILIATION     PRESCRIBED     amLODIPine 5 MG tablet  Commonly known as: NORVASC  Take 1 tablet (5 mg total) by mouth once daily.     oxyCODONE 5 MG immediate release tablet  Commonly known as: ROXICODONE  Take 1 tablet (5 mg total) by mouth every 6 (six) hours as needed for Pain.     polyethylene glycol 17 gram Pwpk  Commonly known as: GLYCOLAX  Take 17 g by mouth 2 (two) times daily as needed.     senna 8.6 mg tablet  Commonly known as: SENOKOT  Take 1 tablet by mouth 2 (two) times a day.     tamsulosin 0.4 mg Cap  Commonly known as: FLOMAX  Take 1 capsule (0.4 mg total) by mouth every evening.       CONTINUE      amiodarone 200 MG Tab  Commonly known as: PACERONE     cetirizine 10 MG tablet  Commonly known as: ZYRTEC     ELIQUIS 5 mg Tab  Generic drug: apixaban     multivitamin with minerals tablet     potassium chloride 20 mEq  Commonly known as: K-TAB            DISCONTINUE     furosemide 20 MG tablet  Commonly known as: LASIX     losartan 25 MG tablet  Commonly known as: COZAAR            These medications were sent to   [facility] [address] [facility] [address]       PHYSICAL EXAM   VITALS: T 97.9 °F (36.6 °C)   BP (!) 157/74   P 62   RR 18   O2 95 %    GENERAL: Awake and in NAD  LUNGS: CTA B/L  CVS: Normal rate  GI/: Soft, NT, bowel sounds positive.  EXTREMITIES: No peripheral edema  NEURO: AAOx3  PSYCH: Cooperative      Discharge time: 33 minutes     Gregorio South MD  [facility] Medicine       DIAGNOSITCS   X-Ray Chest 1 View  Result  Date: 4/17/2023  EXAMINATION: XR CHEST 1 VIEW CPT 27894 CLINICAL HISTORY: SNF Placement; COMPARISON: June 5, 2022 FINDINGS: Examination reveals uncoiling of the thoracic aorta mediastinal silhouette is otherwise within normal limits cardiac silhouette is not enlarged but it is at the upper limits of normal.  There is blunting of both costophrenic angles indicating the presence of bilateral pleural effusions. There is increased left retrocardiac density and silhouetting of the left hemidiaphragm although these might be related to pleural fluid it may also represent an infiltrate/atelectasis. No other focal consolidative changes   Impression:  Changes suggestive of bilateral pleural effusions. Increased left retrocardiac density and silhouetting of the left hemidiaphragm as above Electronically signed by: Balaji Branham Date:    04/17/2023 Time:    10:34    X-Ray Lumbar Spine Ap And Lateral  Result Date: 4/10/2023  EXAMINATION: Three views lumbar spine CLINICAL HISTORY: Low back pain COMPARISON: None FINDINGS: There is an age-indeterminate superior endplate compression deformity of L3.  Alignment is maintained.   Impression:  Age indeterminate L3 compression fracture Electronically signed by: Scott Harris MD Date:    04/10/2023 Time:    15:16    CT Lumbar Spine Without Contrast  Result Date: 4/10/2023  EXAMINATION: CT LUMBAR SPINE WITHOUT CONTRAST CLINICAL HISTORY: L3 spine fracture; TECHNIQUE: Helical acquisition through the lumbar spine without IV contrast.  Three plane reconstructions were provided for review.  mGycm. Automatic exposure control, adjustment of mA/kV or iterative reconstruction technique was used to reduce radiation. COMPARISON: No priors FINDINGS: No significant alignment abnormality.  There is mild superior endplate compression deformity of L3 with minimal retropulsion.  Minimal L1 superior endplate compression deformity versus Schmorl's node without retropulsion.  No gross spinal canal  narrowing.  There are moderate disc and facet degenerative changes.  Some left adrenal thickening is likely adenomatous.  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  Tiny nonobstructing calculus right kidney.  Increased attenuation of the liver which is nonspecific.   Impression:  Mild L3 superior endplate compression deformity is age indeterminate. Electronically signed by: Amanuel Dudley Date:    04/10/2023 Time:    17:01    CT Lumbar Spine With Contrast  Result Date: 4/10/2023  EXAMINATION: CT LUMBAR SPINE WITH CONTRAST CLINICAL HISTORY: L3 compression fracture; TECHNIQUE: Helical acquisition through the lumbar spine with IV contrast.  Three plane reconstructions were provided for review.  mGycm. Automatic exposure control, adjustment of mA/kV or iterative reconstruction technique was used to reduce radiation. COMPARISON: Earlier today FINDINGS: There is stable appearance of the lumbar spine compared to earlier today with moderate L3 compression deformity with minimal retropulsion.  Milder L1 compression deformity with lucent fracture line seen image 34 series 10.  Moderate spinal canal narrowing L3-L4 and L4-L5 secondary to disc and facet degenerative change.  Other chronic findings discussed in prior report.  No new observations after contrast administration.   Impression:  As above.   Electronically signed by: Amanuel Dudley Date:    04/10/2023 Time:    19:27    MRI Brain Without Contrast  Result Date: 4/13/2023  EXAMINATION: MRI BRAIN WITHOUT CONTRAST CLINICAL HISTORY: Mental status change, unknown cause; TECHNIQUE: Multiplanar MRI sequences were performed of the brain without contrast. COMPARISON: CT brain without contrast September 28, 2022 FINDINGS: There are old infarcts which involve the right cerebellum, migel, right basal ganglia and the left corona radiata.  Chronic microvascular ischemic changes are mild with periventricular and deep white matter T2 FLAIR hyperintense signals.   There is generalized cerebral and cerebellar cortical volume loss.  Right basal ganglia old infarct shows gradient echo sequence dephasing artifact related to old hemorrhagic byproducts.  No evidence of diffusion restriction or ADC map signal drop out to suggest acute infarct. The sella and suprasellar areas are unremarkable. The cerebellar tonsils are normally positioned. There is no acute intracranial hemorrhage, hydrocephalus, midline shift or mass effect. No acute extra axial fluid collections identified.  There are bilateral mastoid air cells hyperintense inflammatory signals.  Left maxillary sinus retention cyst.   Impression:  1.  No acute intracranial findings identified.   2.  Multiple old infarcts, chronic microangiopathic ischemia and atrophy.   Electronically signed by: Eusebio Cole Date:    04/13/2023 Time:    12:00    MRI Cervical Spine Without Contrast  Result Date: 4/13/2023  EXAMINATION: MRI CERVICAL SPINE WITHOUT CONTRAST CLINICAL HISTORY: upper extremity weakness; TECHNIQUE: Multiple MRI pulse sequences were performed of the cervical spine without contrast. COMPARISON: CT cervical spine June 5, 2022 FINDINGS: There is trace of grade 1 anterolisthesis of C7 and T1.  There is loss of normal cervical lordosis with some straightening.  Cervical vertebrae stature is preserved.  There are no acute marrow edematous signals.  Anterior degenerative hypertrophic spurrings.  No prevertebral soft tissue prominence.  There are multiple indentations upon the thecal sac by disc pathology and spondylosis without cord edema or myelomalacia.  Disc segmental analysis is given below: At C2-C3, there is central osteophyte disc complex which indents the ventral thecal sac without cord compression.  There is bilateral uncovertebral and facet arthropathy which is more pronounced on the left.  There is no significant narrowing the right neural foramen.  There is moderate spondylotic narrowing of the left neural canal. At  C3-C4, there is disc bulge which indents the ventral thecal sac without cord compression.  This level is also remarkable for uncovertebral and facet arthropathy.  These findings result in bilateral moderate narrowings of the neural foramen. At C4-C5, there is disc bulge which indents ventral subarachnoid space without causing cord compression.  Marked narrowing of the right neural foramen is caused by uncovertebral and facet arthropathy.  There is also moderate spondylotic narrowing of the left neural foramen. At C5-C6, there is osteophyte disc complex which indents the ventral thecal sac.  Cord is not compressed.  There is moderate narrowing of the right neural foramen and marked narrowing of the left neural foramen caused by uncovertebral and facet arthropathy. At C6-C7, there is osteophyte disc complex which indents the ventral thecal sac and is more pronounced in the right paracentral location.  Cord is not compressed.  Bilateral neural foramen are encroached by uncovertebral and to a lesser degree facet arthropathy.  These findings are more pronounced on the left.  This results in moderate narrowing of the right neural foramen and severe narrowing of the left neural canal. At C7-T1, disc height is preserved.  Central canal is not stenosed.  There are no narrowings of the neural foramen   Impression:  Cervical spine degenerative disc disease and spondylosis level by level discussed above.   Electronically signed by: Eusebio Cole Date:    04/13/2023 Time:    12:20    MRI Lumbar Spine Without Contrast  Result Date: 4/13/2023  EXAMINATION: MRI LUMBAR SPINE WITHOUT CONTRAST TECHNIQUE: recurring falls, lower extremity weakness; COMPARISON: CT lumbar spine April 10, 2023 FINDINGS: There is acute compression deformity of L1 vertebral body along the superior endplate which mostly involves the anterior column with mild loss of stature.  The height of the middle column is intact and there is no retropulsed bony fragment  into the spinal canal.  There are anterolateral mild paraspinal soft inflammations.  No epidural inflammation or fluid collection and there is no central canal stenosis. There is chronic compression deformity of L3 vertebral body again mostly involving the anterior column and results in 60% loss of stature and this appears to be related to a prominent Schmorl node defect.  There is again no retropulsed bony fragment into the spinal canal.  Lumbar vertebrae alignment is preserved.  The visualized thoracic cord is unremarkable. The conus medullaris terminates at L1.  Bilateral kidneys multiple hyperintense cystic structures.  Disc segmental analysis is given below: At T12-L1 disc height is preserved.  Central canal is not stenosed and there are no narrowings of the neural foramen. At L1-L2, there is disc bulge, ligamentum flavum thickening and facet arthropathy.  There is no significant central canal stenosis.  There are no narrowings are neural foramen. At L2-L3, there is disc bulge which flattens the ventral thecal sac bilateral facet arthropathy.  These findings combine to cause moderate central canal stenosis.  There are no narrowings of the neural foramen At L3-L4, there is generalized disc bulge with central annular fissure and associated osteophyte complex which compresses the ventral thecal sac.  There is also bilateral facet arthropathy and ligamentum flavum thickening.  These findings cause moderate to marked central canal stenosis.  There are bilateral mild narrowings of the neural foramen. At L4-L5, there is generalized disc bulge, ligamentum flavum thickening and facet arthropathy resulting in marked central canal stenosis.  Bilateral neural foramen dorsally are encroached by facets arthropathy.  There is moderate narrowing of the right neural foramen and mild narrowing of the left neural canal. At L5-S1, disc height is preserved.  Bilateral facet arthropathy.  Central canal is not stenosed.  There is  left facet small synovial cyst.  There are no significant narrowings of the neural foramen.   Impression:  1. L1 vertebral body acute compression deformity involving the anterior column without retropulsed bony fragment into the spinal canal.   2. L3 vertebral body old compression deformity.   3.  Lumbar spine degenerative disc disease and spondylosis level by level discussed above.  Electronically signed by: Eusebio Cole Date:    04/13/2023 Time:    12:34    US Retroperitoneal Complete  Result Date: 4/15/2023  EXAMINATION: US RETROPERITONEAL COMPLETE CLINICAL HISTORY: mary; TECHNIQUE: Multiple sagittal and transverse images were obtained of the kidneys.  Color flow and Doppler imaging was performed as well. COMPARISON: None FINDINGS: The right kidney measures  11.7 cm and the left kidney measures 10.7 cm. The kidneys are poorly visualized due to overlying bowel gas. There is some cortical atrophy seen bilaterally. No hydronephrosis is seen.  No hydroureter is seen. No abnormal calcifications are seen. There is a cyst seen in the right kidney in the midpole that measures 2.6 x 2.8 cm.  There is another simple cyst seen in the upper pole that measures 1.7 x 1.6 cm.  There is a simple cyst seen in the left kidney in the midpole that measures 2.3 x 2.7 cm.  Flow to both kidneys appears unremarkable.. Urinary bladder is decompressed due to Mendez catheter   Impression:  Cortical atrophy seen in both kidneys Bilateral renal cysts Limited examination due to overlying bowel gas   Electronically signed by: Tanja Ramsey Date:    04/15/2023 Time:    11:53    Echo  Result Date: 4/14/2023  · The estimated ejection fraction is 35-40%.   · Concentric hypertrophy and moderately decreased systolic function.   · Grade I left ventricular diastolic dysfunction.   · Normal right ventricular size with normal right ventricular systolic function.   · Mild mitral regurgitation.   · Mild aortic regurgitation.   · Normal central venous  pressure (3 mmHg).   · The estimated PA systolic pressure is 14 mmHg.

## 2023-06-07 ENCOUNTER — LAB REQUISITION (OUTPATIENT)
Dept: LAB | Facility: HOSPITAL | Age: 88
End: 2023-06-07
Payer: MEDICARE

## 2023-06-07 DIAGNOSIS — N17.9 ACUTE KIDNEY FAILURE, UNSPECIFIED: ICD-10-CM

## 2023-06-07 DIAGNOSIS — N40.0 BENIGN PROSTATIC HYPERPLASIA WITHOUT LOWER URINARY TRACT SYMPTOMS: ICD-10-CM

## 2023-06-07 DIAGNOSIS — I48.0 PAROXYSMAL ATRIAL FIBRILLATION: ICD-10-CM

## 2023-06-07 DIAGNOSIS — E11.22 TYPE 2 DIABETES MELLITUS WITH DIABETIC CHRONIC KIDNEY DISEASE: ICD-10-CM

## 2023-06-07 DIAGNOSIS — D64.9 ANEMIA, UNSPECIFIED: ICD-10-CM

## 2023-06-07 DIAGNOSIS — I13.0 HYPERTENSIVE HEART AND CHRONIC KIDNEY DISEASE WITH HEART FAILURE AND STAGE 1 THROUGH STAGE 4 CHRONIC KIDNEY DISEASE, OR UNSPECIFIED CHRONIC KIDNEY DISEASE: ICD-10-CM

## 2023-06-07 LAB
ALBUMIN SERPL-MCNC: 3.4 G/DL (ref 3.4–4.8)
ALBUMIN/GLOB SERPL: 1.4 RATIO (ref 1.1–2)
ALP SERPL-CCNC: 149 UNIT/L (ref 40–150)
ALT SERPL-CCNC: 83 UNIT/L (ref 0–55)
AST SERPL-CCNC: 81 UNIT/L (ref 5–34)
BASOPHILS # BLD AUTO: 0.02 X10(3)/MCL
BASOPHILS NFR BLD AUTO: 0.4 %
BILIRUBIN DIRECT+TOT PNL SERPL-MCNC: 0.6 MG/DL
BUN SERPL-MCNC: 22.9 MG/DL (ref 8.4–25.7)
CALCIUM SERPL-MCNC: 9.4 MG/DL (ref 8.8–10)
CHLORIDE SERPL-SCNC: 100 MMOL/L (ref 98–107)
CO2 SERPL-SCNC: 31 MMOL/L (ref 23–31)
CREAT SERPL-MCNC: 1.45 MG/DL (ref 0.73–1.18)
CRP SERPL HS-MCNC: 4 MG/L
EOSINOPHIL # BLD AUTO: 0.02 X10(3)/MCL (ref 0–0.9)
EOSINOPHIL NFR BLD AUTO: 0.4 %
ERYTHROCYTE [DISTWIDTH] IN BLOOD BY AUTOMATED COUNT: 18.4 % (ref 11.5–17)
ERYTHROCYTE [SEDIMENTATION RATE] IN BLOOD: 3 MM/HR (ref 0–15)
GFR SERPLBLD CREATININE-BSD FMLA CKD-EPI: 46 MLS/MIN/1.73/M2
GLOBULIN SER-MCNC: 2.5 GM/DL (ref 2.4–3.5)
GLUCOSE SERPL-MCNC: 98 MG/DL (ref 82–115)
HCT VFR BLD AUTO: 36.1 % (ref 42–52)
HGB BLD-MCNC: 11.7 G/DL (ref 14–18)
IMM GRANULOCYTES # BLD AUTO: 0 X10(3)/MCL (ref 0–0.04)
IMM GRANULOCYTES NFR BLD AUTO: 0 %
LYMPHOCYTES # BLD AUTO: 0.68 X10(3)/MCL (ref 0.6–4.6)
LYMPHOCYTES NFR BLD AUTO: 14.8 %
MCH RBC QN AUTO: 27.9 PG (ref 27–31)
MCHC RBC AUTO-ENTMCNC: 32.4 G/DL (ref 33–36)
MCV RBC AUTO: 86.2 FL (ref 80–94)
MONOCYTES # BLD AUTO: 0.4 X10(3)/MCL (ref 0.1–1.3)
MONOCYTES NFR BLD AUTO: 8.7 %
NEUTROPHILS # BLD AUTO: 3.46 X10(3)/MCL (ref 2.1–9.2)
NEUTROPHILS NFR BLD AUTO: 75.7 %
NRBC BLD AUTO-RTO: 0 %
PLATELET # BLD AUTO: 126 X10(3)/MCL (ref 130–400)
PMV BLD AUTO: 11 FL (ref 7.4–10.4)
POTASSIUM SERPL-SCNC: 4.3 MMOL/L (ref 3.5–5.1)
PREALB SERPL-MCNC: 29.8 MG/DL (ref 16–42)
PROT SERPL-MCNC: 5.9 GM/DL (ref 5.8–7.6)
RBC # BLD AUTO: 4.19 X10(6)/MCL (ref 4.7–6.1)
SODIUM SERPL-SCNC: 138 MMOL/L (ref 136–145)
WBC # SPEC AUTO: 4.58 X10(3)/MCL (ref 4.5–11.5)

## 2023-06-07 PROCEDURE — 85652 RBC SED RATE AUTOMATED: CPT | Performed by: NURSE PRACTITIONER

## 2023-06-07 PROCEDURE — 85025 COMPLETE CBC W/AUTO DIFF WBC: CPT | Performed by: NURSE PRACTITIONER

## 2023-06-07 PROCEDURE — 86141 C-REACTIVE PROTEIN HS: CPT | Performed by: NURSE PRACTITIONER

## 2023-06-07 PROCEDURE — 80053 COMPREHEN METABOLIC PANEL: CPT | Performed by: NURSE PRACTITIONER

## 2023-06-07 PROCEDURE — 84134 ASSAY OF PREALBUMIN: CPT | Performed by: NURSE PRACTITIONER
